# Patient Record
Sex: FEMALE | Race: WHITE | NOT HISPANIC OR LATINO | Employment: OTHER | URBAN - NONMETROPOLITAN AREA
[De-identification: names, ages, dates, MRNs, and addresses within clinical notes are randomized per-mention and may not be internally consistent; named-entity substitution may affect disease eponyms.]

---

## 2017-05-16 ENCOUNTER — OFFICE VISIT (OUTPATIENT)
Dept: FAMILY MEDICINE | Facility: OTHER | Age: 67
End: 2017-05-16
Attending: NURSE PRACTITIONER
Payer: COMMERCIAL

## 2017-05-16 VITALS
TEMPERATURE: 98.3 F | RESPIRATION RATE: 20 BRPM | HEIGHT: 68 IN | BODY MASS INDEX: 27.13 KG/M2 | DIASTOLIC BLOOD PRESSURE: 76 MMHG | WEIGHT: 179 LBS | SYSTOLIC BLOOD PRESSURE: 128 MMHG | HEART RATE: 80 BPM | OXYGEN SATURATION: 99 %

## 2017-05-16 DIAGNOSIS — R30.0 DYSURIA: Primary | ICD-10-CM

## 2017-05-16 LAB
ALBUMIN UR-MCNC: NEGATIVE MG/DL
APPEARANCE UR: ABNORMAL
BACTERIA #/AREA URNS HPF: ABNORMAL /HPF
BILIRUB UR QL STRIP: NEGATIVE
COLOR UR AUTO: ABNORMAL
GLUCOSE UR STRIP-MCNC: NEGATIVE MG/DL
HGB UR QL STRIP: NEGATIVE
KETONES UR STRIP-MCNC: NEGATIVE MG/DL
LEUKOCYTE ESTERASE UR QL STRIP: ABNORMAL
MUCOUS THREADS #/AREA URNS LPF: PRESENT /LPF
NITRATE UR QL: NEGATIVE
PH UR STRIP: 7.5 PH (ref 4.7–8)
RBC #/AREA URNS AUTO: <1 /HPF (ref 0–2)
SP GR UR STRIP: 1.01 (ref 1–1.03)
SQUAMOUS #/AREA URNS AUTO: 16 /HPF (ref 0–1)
URN SPEC COLLECT METH UR: ABNORMAL
UROBILINOGEN UR STRIP-MCNC: NORMAL MG/DL (ref 0–2)
WBC #/AREA URNS AUTO: 46 /HPF (ref 0–2)

## 2017-05-16 PROCEDURE — 99212 OFFICE O/P EST SF 10 MIN: CPT

## 2017-05-16 PROCEDURE — 87086 URINE CULTURE/COLONY COUNT: CPT | Mod: ZL | Performed by: NURSE PRACTITIONER

## 2017-05-16 PROCEDURE — 99213 OFFICE O/P EST LOW 20 MIN: CPT | Performed by: NURSE PRACTITIONER

## 2017-05-16 PROCEDURE — 81001 URINALYSIS AUTO W/SCOPE: CPT | Mod: ZL | Performed by: NURSE PRACTITIONER

## 2017-05-16 RX ORDER — MULTIPLE VITAMINS W/ MINERALS TAB 9MG-400MCG
1 TAB ORAL DAILY
COMMUNITY
End: 2019-07-26

## 2017-05-16 RX ORDER — CIPROFLOXACIN 500 MG/1
500 TABLET, FILM COATED ORAL 2 TIMES DAILY
Qty: 14 TABLET | Refills: 0 | Status: SHIPPED | OUTPATIENT
Start: 2017-05-16 | End: 2017-05-26

## 2017-05-16 ASSESSMENT — PAIN SCALES - GENERAL: PAINLEVEL: SEVERE PAIN (7)

## 2017-05-16 NOTE — PROGRESS NOTES
SUBJECTIVE:                                                    Sammie Heaton is a 67 year old female who presents to clinic today for the following health issues:      URINARY TRACT SYMPTOMS      Duration: April 12 started Macrobid from [place],     Description  dysuria, frequency, urgency, odor and orange urine once    Intensity:  moderate    Accompanying signs and symptoms:  Fever/chills: YES- chills and low grade fevers  Flank pain YES- slight pain mild discomfort  Nausea and vomiting: no   Vaginal symptoms: discharge  Abdominal/Pelvic Pain: YES- low abdomin     History  History of frequent UTI's: YES- frequent and to urology in the last year  History of kidney stones: no   Sexually Active: no   Possibility of pregnancy: No    Precipitating or alleviating factors: increasing fluids helps with discomfort    Therapies tried and outcome: increase fluid intake   Outcome: decreases throughout the day           Problem list and histories reviewed & adjusted, as indicated.  Additional history: as documented    Patient Active Problem List   Diagnosis     ACP (advance care planning)     Venous stasis ulcers of both lower extremities (H)     Past Surgical History:   Procedure Laterality Date     COLONOSCOPY  11-     ENT SURGERY      tonsillectomy     GYN SURGERY  1987    hysterectomy     GYN SURGERY  2014    right ovary removed at [place]     PHACOEMULSIFICATION WITH STANDARD INTRAOCULAR LENS IMPLANT Left 9/10/2015    Procedure: PHACOEMULSIFICATION WITH STANDARD INTRAOCULAR LENS IMPLANT;  Surgeon: Ray Lucero MD;  Location: [facility]     PHACOEMULSIFICATION WITH STANDARD INTRAOCULAR LENS IMPLANT Right 9/24/2015    Procedure: PHACOEMULSIFICATION WITH STANDARD INTRAOCULAR LENS IMPLANT;  Surgeon: Ray Lucero MD;  Location: [facility] OR       Social History   Substance Use Topics     Smoking status: Former Smoker     Packs/day: 2.00     Years: 15.00     Types: Cigarettes     Quit date: 6/14/1983     Smokeless tobacco:  "Never Used     Alcohol use No     Family History   Problem Relation Age of Onset     HEART DISEASE Mother      C.A.D. Mother      HEART DISEASE Father      MI     Blood Disease Father      Breast Cancer Sister          Current Outpatient Prescriptions   Medication Sig Dispense Refill     multivitamin, therapeutic with minerals (MULTI-VITAMIN) TABS tablet Take 1 tablet by mouth daily       Ascorbic Acid (VITAMIN C PO) Take 500 mg by mouth 2 times daily       aspirin 81 MG tablet Take 1 tablet by mouth daily.       UNABLE TO FIND Take by mouth 3 times daily as needed MEDICATION NAME: Bone maximizer 3       sodium chloride (OCEAN) 0.65 % nasal spray Spray 1 spray into both nostrils daily as needed for congestion       Triamcinolone Acetonide (NASACORT AQ NA) Spray 1 spray in nostril daily       KRILL OIL OMEGA-3 PO Take by mouth 2 times daily       GARLIC PO Take 1 tablet by mouth daily       GABAPENTIN PO Take 300 mg by mouth 2 times daily       omeprazole (PRILOSEC) 20 MG capsule Take 1 capsule by mouth daily.       Coenzyme Q10 (CO Q-10 PO) Take 1 tablet by mouth daily.       Allergies   Allergen Reactions     Sulfa Drugs Other (See Comments)     Flush; sulfonamide antibiotics       Reviewed and updated as needed this visit by clinical staff  Allergies       Reviewed and updated as needed this visit by Provider         ROS:  CONSTITUTIONAL:chills and fever low grade  E/M: NEGATIVE for ear, mouth and throat problems  R: NEGATIVE for significant cough or SOB  CV: NEGATIVE for chest pain, palpitations or peripheral edema  GI: abdominal pain lower abdomen   : dysuria, frequency, hesitancy, retention, urgency and urinary tract infection    OBJECTIVE:                                                    /76 (BP Location: Left arm, Patient Position: Chair, Cuff Size: Adult Large)  Pulse 80  Temp 98.3  F (36.8  C) (Tympanic)  Resp 20  Ht 5' 8\" (1.727 m)  Wt 179 lb (81.2 kg)  SpO2 99%  BMI 27.22 kg/m2  Body " mass index is 27.22 kg/(m^2).   GENERAL: healthy, alert and no distress  NECK: no adenopathy, no asymmetry, masses, or scars and thyroid normal to palpation  RESP: lungs clear to auscultation - no rales, rhonchi or wheezes  CV: regular rate and rhythm, normal S1 S2, no S3 or S4, no murmur, click or rub, no peripheral edema and peripheral pulses strong  ABDOMEN: tenderness suprapubic and bowel sounds normal  SKIN: dryness to left wrist itching  PSYCH: mentation appears normal, affect normal/bright    Diagnostic Test Results:  Results for orders placed or performed in visit on 05/16/17 (from the past 24 hour(s))   UA reflex to Microscopic and Culture   Result Value Ref Range    Color Urine Light Yellow     Appearance Urine Slightly Cloudy     Glucose Urine Negative NEG mg/dL    Bilirubin Urine Negative NEG    Ketones Urine Negative NEG mg/dL    Specific Gravity Urine 1.007 1.003 - 1.035    Blood Urine Negative NEG    pH Urine 7.5 4.7 - 8.0 pH    Protein Albumin Urine Negative NEG mg/dL    Urobilinogen mg/dL Normal 0.0 - 2.0 mg/dL    Nitrite Urine Negative NEG    Leukocyte Esterase Urine Large (A) NEG    Source Midstream Urine     RBC Urine <1 0 - 2 /HPF    WBC Urine 46 (H) 0 - 2 /HPF    Bacteria Urine Few (A) NEG /HPF    Squamous Epithelial /HPF Urine 16 (H) 0 - 1 /HPF    Mucous Urine Present (A) NEG /LPF        ASSESSMENT:                                                        PLAN:                                                    ASSESSMENT / PLAN:  (R30.0) Dysuria  (primary encounter diagnosis)  Comment:   Plan:  UA reflex to Microscopic and Culture,    Urine Culture Aerobic Bacterial,    ciprofloxacin (CIPRO) 500 MG tablet   Drink plenty of water               Follow up in 10 days with your PCP for repeat urine - should be off antibiotic for 48 hours prior to urine collection. Follow sooner if symptoms worsen        SAMANTHA Hightower [facility] [place]

## 2017-05-16 NOTE — NURSING NOTE
"Chief Complaint   Patient presents with     UTI       Initial /76 (BP Location: Left arm, Patient Position: Chair, Cuff Size: Adult Large)  Pulse 80  Temp 98.3  F (36.8  C) (Tympanic)  Resp 20  Ht 5' 8\" (1.727 m)  Wt 179 lb (81.2 kg)  SpO2 99%  BMI 27.22 kg/m2 Estimated body mass index is 27.22 kg/(m^2) as calculated from the following:    Height as of this encounter: 5' 8\" (1.727 m).    Weight as of this encounter: 179 lb (81.2 kg).  Medication Reconciliation: complete   Gavi Lancaster      "

## 2017-05-16 NOTE — PATIENT INSTRUCTIONS
ASSESSMENT / PLAN:  (R30.0) Dysuria  (primary encounter diagnosis)  Comment:   Plan:  UA reflex to Microscopic and Culture,    Urine Culture Aerobic Bacterial,    ciprofloxacin (CIPRO) 500 MG tablet   Drink plenty of water      Follow up with PCP in 10 days should be off antibiotic for at least 48 hours before repeating urine

## 2017-05-16 NOTE — MR AVS SNAPSHOT
"              After Visit Summary   5/16/2017    Sammie Heaton    MRN: [id number]           Patient Information     Date Of Birth          1950        Visit Information        Provider Department      5/16/2017 3:10 PM Shraddha Newton APRN CNP [facility]        Today's Diagnoses     Dysuria    -  1      Care Instructions    ASSESSMENT / PLAN:  (R30.0) Dysuria  (primary encounter diagnosis)  Comment:   Plan:  UA reflex to Microscopic and Culture,    Urine Culture Aerobic Bacterial,    ciprofloxacin (CIPRO) 500 MG tablet   Drink plenty of water      Follow up with PCP in 10 days should be off antibiotic for at least 48 hours before repeating urine        Follow-ups after your visit        Follow-up notes from your care team     Return in about 10 days (around 5/26/2017).      Who to contact     If you have questions or need follow up information about today's clinic visit or your schedule please contact [facility] directly at [phone].  Normal or non-critical lab and imaging results will be communicated to you by [employer], letter or phone within 4 business days after the clinic has received the results. If you do not hear from us within 7 days, please contact the clinic through [employer] or phone. If you have a critical or abnormal lab result, we will notify you by phone as soon as possible.  Submit refill requests through [employer] or call your pharmacy and they will forward the refill request to us. Please allow 3 business days for your refill to be completed.          Additional Information About Your Visit        [employer] Information     [employer] lets you send messages to your doctor, view your test results, renew your prescriptions, schedule appointments and more. To sign up, go to [email or web address] . Click on \"Log in\" on the left side of the screen, which will take you to the Welcome page. Then click on \"Sign up Now\" on the right side of the page.     You will be " "asked to enter the access code listed below, as well as some personal information. Please follow the directions to create your username and password.     Your access code is: [email or web address]  Expires: 2017  4:55 PM     Your access code will  in 90 days. If you need help or a new code, please call your [facility] or [phone].        Care EveryWhere ID     This is your Care EveryWhere ID. This could be used by other organizations to access your [place] medical records  [id number]        Your Vitals Were     Pulse Temperature Respirations Height Pulse Oximetry BMI (Body Mass Index)    80 98.3  F (36.8  C) (Tympanic) 20 5' 8\" (1.727 m) 99% 27.22 kg/m2       Blood Pressure from Last 3 Encounters:   17 128/76   16 124/78   16 120/70    Weight from Last 3 Encounters:   17 179 lb (81.2 kg)   16 180 lb (81.6 kg)   16 175 lb (79.4 kg)              We Performed the Following     UA reflex to Microscopic and Culture     Urine Culture Aerobic Bacterial          Today's Medication Changes          These changes are accurate as of: 17  4:55 PM.  If you have any questions, ask your nurse or doctor.               Start taking these medicines.        Dose/Directions    ciprofloxacin 500 MG tablet   Commonly known as:  CIPRO   Used for:  Dysuria   Started by:  Shraddha Newton APRN CNP        Dose:  500 mg   Take 1 tablet (500 mg) by mouth 2 times daily   Quantity:  14 tablet   Refills:  0         Stop taking these medicines if you haven't already. Please contact your care team if you have questions.     vitamin B complex with vitamin C Tabs tablet   Stopped by:  Shraddha Newton APRN CNP                Where to get your medicines      These medications were sent to [id number] [address] [place] - [address]   [id number] , [place] [address]     Phone:  [phone]     ciprofloxacin 500 MG tablet                Primary Care Provider Office Phone # " Fax #    Paddy Ibrahim [phone]       [facility] [address]  [facility] [address]        Thank you!     Thank you for choosing [facility]  for your care. Our goal is always to provide you with excellent care. Hearing back from our patients is one way we can continue to improve our services. Please take a few minutes to complete the written survey that you may receive in the mail after your visit with us. Thank you!             Your Updated Medication List - Protect others around you: Learn how to safely use, store and throw away your medicines at www.disposemymeds.org.          This list is accurate as of: 5/16/17  4:55 PM.  Always use your most recent med list.                   Brand Name Dispense Instructions for use    aspirin 81 MG tablet      Take 1 tablet by mouth daily.       ciprofloxacin 500 MG tablet    CIPRO    14 tablet    Take 1 tablet (500 mg) by mouth 2 times daily       CO Q-10 PO      Take 1 tablet by mouth daily.       GABAPENTIN PO      Take 300 mg by mouth 2 times daily       GARLIC PO      Take 1 tablet by mouth daily       KRILL OIL OMEGA-3 PO      Take by mouth 2 times daily       Multi-vitamin Tabs tablet      Take 1 tablet by mouth daily       NASACORT AQ NA      [place] 1 spray in nostril daily       priLOSEC 20 MG CR capsule   Generic drug:  omeprazole      Take 1 capsule by mouth daily.       sodium chloride 0.65 % nasal spray    OCEAN     Spray 1 spray into both nostrils daily as needed for congestion       UNABLE TO FIND      Take by mouth 3 times daily as needed MEDICATION NAME: Bone maximizer 3       VITAMIN C PO      Take 500 mg by mouth 2 times daily

## 2017-05-18 LAB
BACTERIA SPEC CULT: ABNORMAL
MICRO REPORT STATUS: ABNORMAL
SPECIMEN SOURCE: ABNORMAL

## 2017-05-26 ENCOUNTER — OFFICE VISIT (OUTPATIENT)
Dept: FAMILY MEDICINE | Facility: OTHER | Age: 67
End: 2017-05-26
Attending: PHYSICIAN ASSISTANT
Payer: MEDICARE

## 2017-05-26 VITALS
TEMPERATURE: 97.1 F | HEIGHT: 68 IN | RESPIRATION RATE: 16 BRPM | SYSTOLIC BLOOD PRESSURE: 148 MMHG | DIASTOLIC BLOOD PRESSURE: 83 MMHG | HEART RATE: 84 BPM | BODY MASS INDEX: 26.64 KG/M2 | WEIGHT: 175.8 LBS | OXYGEN SATURATION: 98 %

## 2017-05-26 DIAGNOSIS — Z87.440 PERSONAL HISTORY OF URINARY TRACT INFECTION: Primary | ICD-10-CM

## 2017-05-26 DIAGNOSIS — R30.0 DYSURIA: ICD-10-CM

## 2017-05-26 LAB
BASOPHILS # BLD AUTO: 0 10E9/L (ref 0–0.2)
BASOPHILS NFR BLD AUTO: 0.2 %
DIFFERENTIAL METHOD BLD: NORMAL
EOSINOPHIL # BLD AUTO: 0.2 10E9/L (ref 0–0.7)
EOSINOPHIL NFR BLD AUTO: 1.8 %
ERYTHROCYTE [DISTWIDTH] IN BLOOD BY AUTOMATED COUNT: 12.4 % (ref 10–15)
HCT VFR BLD AUTO: 39.6 % (ref 35–47)
HGB BLD-MCNC: 13.4 G/DL (ref 11.7–15.7)
LYMPHOCYTES # BLD AUTO: 2.7 10E9/L (ref 0.8–5.3)
LYMPHOCYTES NFR BLD AUTO: 33.6 %
MCH RBC QN AUTO: 29 PG (ref 26.5–33)
MCHC RBC AUTO-ENTMCNC: 33.8 G/DL (ref 31.5–36.5)
MCV RBC AUTO: 86 FL (ref 78–100)
MONOCYTES # BLD AUTO: 0.6 10E9/L (ref 0–1.3)
MONOCYTES NFR BLD AUTO: 7.1 %
NEUTROPHILS # BLD AUTO: 4.7 10E9/L (ref 1.6–8.3)
NEUTROPHILS NFR BLD AUTO: 57.3 %
PLATELET # BLD AUTO: 300 10E9/L (ref 150–450)
RBC # BLD AUTO: 4.62 10E12/L (ref 3.8–5.2)
WBC # BLD AUTO: 8.1 10E9/L (ref 4–11)

## 2017-05-26 PROCEDURE — 87086 URINE CULTURE/COLONY COUNT: CPT | Mod: ZL | Performed by: PHYSICIAN ASSISTANT

## 2017-05-26 PROCEDURE — 99213 OFFICE O/P EST LOW 20 MIN: CPT | Performed by: PHYSICIAN ASSISTANT

## 2017-05-26 PROCEDURE — 85025 COMPLETE CBC W/AUTO DIFF WBC: CPT | Mod: ZL | Performed by: PHYSICIAN ASSISTANT

## 2017-05-26 PROCEDURE — 99212 OFFICE O/P EST SF 10 MIN: CPT

## 2017-05-26 PROCEDURE — 36415 COLL VENOUS BLD VENIPUNCTURE: CPT | Mod: ZL | Performed by: PHYSICIAN ASSISTANT

## 2017-05-26 ASSESSMENT — PAIN SCALES - GENERAL: PAINLEVEL: NO PAIN (0)

## 2017-05-26 NOTE — NURSING NOTE
"Chief Complaint   Patient presents with     UTI     Pt is in for a FU after UTI. Pt saw Shraddha Slater on 05/16/17 and was put on Cipro.       Initial /83 (BP Location: Right arm, Patient Position: Chair, Cuff Size: Adult Regular)  Pulse 84  Temp 97.1  F (36.2  C) (Tympanic)  Resp 16  Ht 5' 8\" (1.727 m)  Wt 175 lb 12.8 oz (79.7 kg)  SpO2 98%  BMI 26.73 kg/m2 Estimated body mass index is 26.73 kg/(m^2) as calculated from the following:    Height as of this encounter: 5' 8\" (1.727 m).    Weight as of this encounter: 175 lb 12.8 oz (79.7 kg).  Medication Reconciliation: complete   Luz Ford    "

## 2017-05-26 NOTE — MR AVS SNAPSHOT
"              After Visit Summary   5/26/2017    Sammie Heaton    MRN: [id number]           Patient Information     Date Of Birth          1950        Visit Information        Provider Department      5/26/2017 1:45 PM Chen Rand PA [facility]        Today's Diagnoses     Personal history of urinary tract infection    -  1    Dysuria           Follow-ups after your visit        Who to contact     If you have questions or need follow up information about today's clinic visit or your schedule please contact [facility] directly at [phone].  Normal or non-critical lab and imaging results will be communicated to you by MyChart, letter or phone within 4 business days after the clinic has received the results. If you do not hear from us within 7 days, please contact the clinic through MyChart or phone. If you have a critical or abnormal lab result, we will notify you by phone as soon as possible.  Submit refill requests through [employer] or call your pharmacy and they will forward the refill request to us. Please allow 3 business days for your refill to be completed.          Additional Information About Your Visit        Care EveryWhere ID     This is your Care EveryWhere ID. This could be used by other organizations to access your [place] medical records  [id number]        Your Vitals Were     Pulse Temperature Respirations Height Pulse Oximetry BMI (Body Mass Index)    84 97.1  F (36.2  C) (Tympanic) 16 5' 8\" (1.727 m) 98% 26.73 kg/m2       Blood Pressure from Last 3 Encounters:   05/26/17 148/83   05/16/17 128/76   09/08/16 124/78    Weight from Last 3 Encounters:   05/26/17 175 lb 12.8 oz (79.7 kg)   05/16/17 179 lb (81.2 kg)   09/08/16 180 lb (81.6 kg)              We Performed the Following     CBC with platelets and differential     Urine Culture Aerobic Bacterial          Today's Medication Changes          These changes are accurate as of: 5/26/17  2:26 PM.  If " you have any questions, ask your nurse or doctor.               Start taking these medicines.        Dose/Directions    amoxicillin-clavulanate 875-125 MG per tablet   Commonly known as:  AUGMENTIN   Used for:  Dysuria   Started by:  Chen Rand PA        Dose:  1 tablet   Take 1 tablet by mouth 2 times daily   Quantity:  20 tablet   Refills:  0            Where to get your medicines      These medications were sent to [id number] - [place] - [address]   [id number] , [place] [address]     Phone:  [phone]     amoxicillin-clavulanate 875-125 MG per tablet                Primary Care Provider Office Phone # Fax #    Paddy Ibrahim [phone]       [address] [address]        Thank you!     Thank you for choosing [facility]  for your care. Our goal is always to provide you with excellent care. Hearing back from our patients is one way we can continue to improve our services. Please take a few minutes to complete the written survey that you may receive in the mail after your visit with us. Thank you!             Your Updated Medication List - Protect others around you: Learn how to safely use, store and throw away your medicines at www.disposemymeds.org.          This list is accurate as of: 5/26/17  2:26 PM.  Always use your most recent med list.                   Brand Name Dispense Instructions for use    amoxicillin-clavulanate 875-125 MG per tablet    AUGMENTIN    20 tablet    Take 1 tablet by mouth 2 times daily       aspirin 81 MG tablet      Take 1 tablet by mouth daily.       CO Q-10 PO      Take 1 tablet by mouth daily.       GABAPENTIN PO      Take 300 mg by mouth 2 times daily       GARLIC PO      Take 1 tablet by mouth daily       KRILL OIL OMEGA-3 PO      Take by mouth 2 times daily       LIPITOR PO      Take 10 mg by mouth daily       Multi-vitamin Tabs tablet      Take 1 tablet by mouth daily        NASACORT AQ NA      Spray 1 spray in nostril daily       PROBIOTIC DAILY PO      Take 1 capsule by mouth daily       sodium chloride 0.65 % nasal spray    OCEAN     Spray 1 spray into both nostrils daily as needed for congestion       TUMS PO      Take by mouth as needed       UNABLE TO FIND      Take by mouth 3 times daily as needed MEDICATION NAME: Bone maximizer 3       VITAMIN C PO      Take 500 mg by mouth 2 times daily

## 2017-05-26 NOTE — PROGRESS NOTES
Chief complaint:   Chief Complaint   Patient presents with     UTI     Pt is in for a FU after UTI. Pt saw Shraddha Slater on 05/16/17 and was put on Cipro.       Subjective: Sammie Heaton is a 67 year old female for f/u of UTI. Symptoms since 4-12. Treated with Macrobid and Cipro without resolution. No dysuria but urinary frequency and urgency. Denies flank pain, nausea, vomiting, fever or abnormal vaginal discharge    PMH, PSH, FH reviewed and unchanged    Current Outpatient Prescriptions   Medication Sig Dispense Refill     Calcium Carbonate Antacid (TUMS PO) Take by mouth as needed       Probiotic Product (PROBIOTIC DAILY PO) Take 1 capsule by mouth daily       Atorvastatin Calcium (LIPITOR PO) Take 10 mg by mouth daily       multivitamin, therapeutic with minerals (MULTI-VITAMIN) TABS tablet Take 1 tablet by mouth daily       Ascorbic Acid (VITAMIN C PO) Take 500 mg by mouth 2 times daily       UNABLE TO FIND Take by mouth 3 times daily as needed MEDICATION NAME: Bone maximizer 3       sodium chloride (OCEAN) 0.65 % nasal spray Spray 1 spray into both nostrils daily as needed for congestion       Triamcinolone Acetonide (NASACORT AQ NA) Spray 1 spray in nostril daily       KRILL OIL OMEGA-3 PO Take by mouth 2 times daily       GARLIC PO Take 1 tablet by mouth daily       GABAPENTIN PO Take 300 mg by mouth 2 times daily       aspirin 81 MG tablet Take 1 tablet by mouth daily.       Coenzyme Q10 (CO Q-10 PO) Take 1 tablet by mouth daily.         Allergies   Allergen Reactions     Sulfa Drugs Other (See Comments)     Flush; sulfonamide antibiotics         ROS:  GI/: as above  Other pertinent systems reviewed and negative    Objective:   B/P: 148/83, T: 97.1, P: 84, R: 16    Gen:Appears well, in no apparent distress.   Resp: Lungs CTA without wheeze, rales, rhonchi  Card: RRR  Abd:Round, soft. Normal bowel sounds. NTTP. No mass or organomegaly. No CVA TTP.        Assessment:     (R30.0) Dysuria  Comment:  Urine reflexed to culture. Start Augmentin pending culture results  Plan: Urine Culture Aerobic Bacterial, CBC with         platelets and differential,         amoxicillin-clavulanate (AUGMENTIN) 875-125 MG         per tablet              Plan: Prescription per Epic - also push fluids, recommend cranberry juice. Call or return to clinic prn if these symptoms worsen or fail to improve as anticipated.        Chen Rand PA-C

## 2017-05-28 LAB
BACTERIA SPEC CULT: ABNORMAL
MICRO REPORT STATUS: ABNORMAL
SPECIMEN SOURCE: ABNORMAL

## 2017-06-01 ENCOUNTER — OFFICE VISIT (OUTPATIENT)
Dept: FAMILY MEDICINE | Facility: OTHER | Age: 67
End: 2017-06-01
Attending: PHYSICIAN ASSISTANT
Payer: COMMERCIAL

## 2017-06-01 VITALS
OXYGEN SATURATION: 98 % | TEMPERATURE: 98.1 F | SYSTOLIC BLOOD PRESSURE: 126 MMHG | WEIGHT: 175 LBS | BODY MASS INDEX: 26.52 KG/M2 | DIASTOLIC BLOOD PRESSURE: 70 MMHG | RESPIRATION RATE: 16 BRPM | HEART RATE: 85 BPM | HEIGHT: 68 IN

## 2017-06-01 DIAGNOSIS — N76.0 BACTERIAL VAGINOSIS: ICD-10-CM

## 2017-06-01 DIAGNOSIS — R30.0 DYSURIA: Primary | ICD-10-CM

## 2017-06-01 DIAGNOSIS — B37.31 CANDIDIASIS OF VAGINA: ICD-10-CM

## 2017-06-01 DIAGNOSIS — N89.8 VAGINAL DISCHARGE: ICD-10-CM

## 2017-06-01 DIAGNOSIS — B96.89 BACTERIAL VAGINOSIS: ICD-10-CM

## 2017-06-01 LAB
ALBUMIN UR-MCNC: NEGATIVE MG/DL
APPEARANCE UR: CLEAR
BACTERIA #/AREA URNS HPF: ABNORMAL /HPF
BILIRUB UR QL STRIP: NEGATIVE
COLOR UR AUTO: YELLOW
GLUCOSE UR STRIP-MCNC: NEGATIVE MG/DL
HGB UR QL STRIP: NEGATIVE
KETONES UR STRIP-MCNC: NEGATIVE MG/DL
LEUKOCYTE ESTERASE UR QL STRIP: ABNORMAL
MICRO REPORT STATUS: ABNORMAL
NITRATE UR QL: NEGATIVE
NON-SQ EPI CELLS #/AREA URNS LPF: ABNORMAL /LPF
PH UR STRIP: 6.5 PH (ref 5–7)
RBC #/AREA URNS AUTO: ABNORMAL /HPF (ref 0–2)
SP GR UR STRIP: <=1.005 (ref 1–1.03)
SPECIMEN SOURCE: ABNORMAL
URN SPEC COLLECT METH UR: ABNORMAL
UROBILINOGEN UR STRIP-ACNC: 0.2 EU/DL (ref 0.2–1)
WBC #/AREA URNS AUTO: ABNORMAL /HPF (ref 0–2)
WET PREP SPEC: ABNORMAL

## 2017-06-01 PROCEDURE — 87210 SMEAR WET MOUNT SALINE/INK: CPT | Mod: ZL | Performed by: PHYSICIAN ASSISTANT

## 2017-06-01 PROCEDURE — 99213 OFFICE O/P EST LOW 20 MIN: CPT | Performed by: PHYSICIAN ASSISTANT

## 2017-06-01 PROCEDURE — 81001 URINALYSIS AUTO W/SCOPE: CPT | Mod: ZL | Performed by: PHYSICIAN ASSISTANT

## 2017-06-01 PROCEDURE — 99212 OFFICE O/P EST SF 10 MIN: CPT

## 2017-06-01 RX ORDER — FLUCONAZOLE 150 MG/1
150 TABLET ORAL
Qty: 4 TABLET | Refills: 0 | Status: SHIPPED | OUTPATIENT
Start: 2017-06-01 | End: 2017-08-08

## 2017-06-01 RX ORDER — METRONIDAZOLE 500 MG/1
500 TABLET ORAL 2 TIMES DAILY
Qty: 14 TABLET | Refills: 0 | Status: SHIPPED | OUTPATIENT
Start: 2017-06-01 | End: 2017-06-09

## 2017-06-01 ASSESSMENT — PAIN SCALES - GENERAL: PAINLEVEL: MODERATE PAIN (4)

## 2017-06-01 NOTE — PROGRESS NOTES
Subjective:  Sammie Heaton is a 67 year old female  who presents with a 1 week history of vaginal discharge. Itchy. Low pelvic pain. She has been on 3 different antibiotics recently for UTI    Active diagnoses this visit:     UTI (urinary tract infection)  Dysuria    Past Medical History:   Diagnosis Date     Abdominal pain, unspecified site 09/19/2001     Cystitis, unspecified 05/12/2003     Dermatophytosis of the body 11/07/2006     Follow-up examination, following unspecified surgery 03/26/2003     Hemorrhage of rectum and anus 10/20/2005     Nonspecific abnormal findings on radiological and 06/27/2000     Osteoporosis, unspecified 07/26/2000     Other abnormal findings on radiological examinatio 03/18/2002     Other screening mammogram 09/11/2001     Routine general medical examination at a health care facility 06/14/2000     Sebaceous cyst 03/10/2003     Unspecified ulcer of lower limb 11/06/2003     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) 12/03/2003     Venous (peripheral) insufficiency, unspecified 05/27/2004       Past Surgical History:   Procedure Laterality Date     COLONOSCOPY  11-     ENT SURGERY      tonsillectomy     GYN SURGERY  1987    hysterectomy     GYN SURGERY  2014    right ovary removed at [place]     PHACOEMULSIFICATION WITH STANDARD INTRAOCULAR LENS IMPLANT Left 9/10/2015    Procedure: PHACOEMULSIFICATION WITH STANDARD INTRAOCULAR LENS IMPLANT;  Surgeon: Ray Lucero MD;  Location: [facility] OR     PHACOEMULSIFICATION WITH STANDARD INTRAOCULAR LENS IMPLANT Right 9/24/2015    Procedure: PHACOEMULSIFICATION WITH STANDARD INTRAOCULAR LENS IMPLANT;  Surgeon: Ray Lucero MD;  Location: [facility] OR       Family History   Problem Relation Age of Onset     HEART DISEASE Mother      C.A.D. Mother      HEART DISEASE Father      MI     Blood Disease Father      Breast Cancer Sister        Current Outpatient Prescriptions   Medication     Calcium Carbonate Antacid (TUMS PO)     Probiotic  Product (PROBIOTIC DAILY PO)     Atorvastatin Calcium (LIPITOR PO)     amoxicillin-clavulanate (AUGMENTIN) 875-125 MG per tablet     multivitamin, therapeutic with minerals (MULTI-VITAMIN) TABS tablet     Ascorbic Acid (VITAMIN C PO)     UNABLE TO FIND     sodium chloride (OCEAN) 0.65 % nasal spray     Triamcinolone Acetonide (NASACORT AQ NA)     KRILL OIL OMEGA-3 PO     GARLIC PO     GABAPENTIN PO     aspirin 81 MG tablet     Coenzyme Q10 (CO Q-10 PO)     No current facility-administered medications for this visit.        Allergies   Allergen Reactions     Sulfa Drugs Other (See Comments)     Flush; sulfonamide antibiotics       Review of Systems:  Gen: negative for fever, chills, change in weight  Derm: negative for  rash  GI: negative for abdominal pain, nausea, vomiting, diarrhea  : As above. Negative for urinary frequency, dysuria, or hematuria, pelvic pain  Psych: negative for changes in mood or affect    Objective:    B/P: 126/70, T: 98.1, P: 85, R: 16    Physical Exam:  Constitutional: healthy, alert and no acute distress  CV: RRR. No murmur  Pulm: Lungs clear to auscultation without wheeze, rales or rhonchi  GI: Abdomen soft, non-tender. BS normal. No masses, organomegaly  Pelvic: Normal external genitalia and vaginal mucosa. Wet prep. Bimanual exam reveals no cervical motion tenderness. No adnexal tenderness to palpation. No mass.  Skin: no suspicious lesions or rashes  Psych: mentation and affect appear normal      Assessment/Plan        (N39.0) UTI (urinary tract infection)  Comment: UA improved. Finish out Augmentin  Plan: *UA reflex to Microscopic and Culture            (N89.8) Vaginal discharge  Plan: Wet prep            (N76.0,  B96.89) Bacterial vaginosis  Comment: Positive  Plan: metroNIDAZOLE (FLAGYL) 500 MG tablet            (B37.3) Candidiasis of vagina  Comment: Positive  Plan: fluconazole (DIFLUCAN) 150 MG tablet                  Rx per EPIC.  Risk/benefit/side effects and intended purposes  discussed.   Rest, increase fluids. Tylenol for fever or discomfort. Follow up if symptoms persist or worsen.      Chen Rand, PAC

## 2017-06-01 NOTE — MR AVS SNAPSHOT
"              After Visit Summary   6/1/2017    Sammie Heaton    MRN: [id number]           Patient Information     Date Of Birth          1950        Visit Information        Provider Department      6/1/2017 3:00 PM Chen Rand PA [facility]        Today's Diagnoses     Dysuria    -  1    UTI (urinary tract infection)        Vaginal discharge        Bacterial vaginosis        Candidiasis of vagina           Follow-ups after your visit        Who to contact     If you have questions or need follow up information about today's clinic visit or your schedule please contact [facility] directly at [phone].  Normal or non-critical lab and imaging results will be communicated to you by MyChart, letter or phone within 4 business days after the clinic has received the results. If you do not hear from us within 7 days, please contact the clinic through MyChart or phone. If you have a critical or abnormal lab result, we will notify you by phone as soon as possible.  Submit refill requests through [employer] or call your pharmacy and they will forward the refill request to us. Please allow 3 business days for your refill to be completed.          Additional Information About Your Visit        Care EveryWhere ID     This is your Care EveryWhere ID. This could be used by other organizations to access your [place] medical records  [id number]        Your Vitals Were     Pulse Temperature Respirations Height Pulse Oximetry BMI (Body Mass Index)    85 98.1  F (36.7  C) (Tympanic) 16 5' 8\" (1.727 m) 98% 26.61 kg/m2       Blood Pressure from Last 3 Encounters:   06/01/17 126/70   05/26/17 148/83   05/16/17 128/76    Weight from Last 3 Encounters:   06/01/17 175 lb (79.4 kg)   05/26/17 175 lb 12.8 oz (79.7 kg)   05/16/17 179 lb (81.2 kg)              We Performed the Following     *UA reflex to Microscopic and Culture     Urine Microscopic     Wet prep          Today's Medication Changes "          These changes are accurate as of: 6/1/17  4:02 PM.  If you have any questions, ask your nurse or doctor.               Start taking these medicines.        Dose/Directions    fluconazole 150 MG tablet   Commonly known as:  DIFLUCAN   Used for:  Candidiasis of vagina   Started by:  Chen Rand PA        Dose:  150 mg   Take 1 tablet (150 mg) by mouth every 3 days   Quantity:  4 tablet   Refills:  0       metroNIDAZOLE 500 MG tablet   Commonly known as:  FLAGYL   Used for:  Bacterial vaginosis   Started by:  Chen Rand PA        Dose:  500 mg   Take 1 tablet (500 mg) by mouth 2 times daily   Quantity:  14 tablet   Refills:  0            Where to get your medicines      These medications were sent to [id number] - [place], [place] - [address]  [id number] , [place] [address]     Phone:  [phone]     fluconazole 150 MG tablet    metroNIDAZOLE 500 MG tablet                Primary Care Provider Office Phone # Fax #    Paddy Ibrahim [phone]       [address] [address]        Thank you!     Thank you for choosing [facility]  for your care. Our goal is always to provide you with excellent care. Hearing back from our patients is one way we can continue to improve our services. Please take a few minutes to complete the written survey that you may receive in the mail after your visit with us. Thank you!             Your Updated Medication List - Protect others around you: Learn how to safely use, store and throw away your medicines at www.disposemymeds.org.          This list is accurate as of: 6/1/17  4:02 PM.  Always use your most recent med list.                   Brand Name Dispense Instructions for use    amoxicillin-clavulanate 875-125 MG per tablet    AUGMENTIN    20 tablet    Take 1 tablet by mouth 2 times daily       aspirin 81 MG tablet      Take 1 tablet by mouth daily.       CO Q-10 PO      Take 1  tablet by mouth daily.       fluconazole 150 MG tablet    DIFLUCAN    4 tablet    Take 1 tablet (150 mg) by mouth every 3 days       GABAPENTIN PO      Take 300 mg by mouth 2 times daily       GARLIC PO      Take 1 tablet by mouth daily       KRILL OIL OMEGA-3 PO      Take by mouth 2 times daily       LIPITOR PO      Take 10 mg by mouth daily       metroNIDAZOLE 500 MG tablet    FLAGYL    14 tablet    Take 1 tablet (500 mg) by mouth 2 times daily       Multi-vitamin Tabs tablet      Take 1 tablet by mouth daily       NASACORT AQ NA      [place] 1 spray in nostril daily       PROBIOTIC DAILY PO      Take 1 capsule by mouth daily       sodium chloride 0.65 % nasal spray    OCEAN     Spray 1 spray into both nostrils daily as needed for congestion       TUMS PO      Take by mouth as needed       UNABLE TO FIND      Take by mouth 3 times daily as needed MEDICATION NAME: Bone maximizer 3       VITAMIN C PO      Take 500 mg by mouth 2 times daily

## 2017-06-07 ENCOUNTER — TRANSFERRED RECORDS (OUTPATIENT)
Dept: HEALTH INFORMATION MANAGEMENT | Facility: HOSPITAL | Age: 67
End: 2017-06-07

## 2017-06-07 LAB
CREAT SERPL-MCNC: 0.82 MG/DL (ref 0.4–1)
GLUCOSE SERPL-MCNC: 98 MG/DL (ref 70–100)
POTASSIUM SERPL-SCNC: 4.2 MEQ/L (ref 3.4–5.1)

## 2017-06-09 ENCOUNTER — OFFICE VISIT (OUTPATIENT)
Dept: FAMILY MEDICINE | Facility: OTHER | Age: 67
End: 2017-06-09
Attending: PHYSICIAN ASSISTANT
Payer: COMMERCIAL

## 2017-06-09 VITALS
DIASTOLIC BLOOD PRESSURE: 83 MMHG | OXYGEN SATURATION: 98 % | HEART RATE: 85 BPM | HEIGHT: 68 IN | WEIGHT: 176.6 LBS | TEMPERATURE: 98.6 F | BODY MASS INDEX: 26.76 KG/M2 | SYSTOLIC BLOOD PRESSURE: 134 MMHG | RESPIRATION RATE: 16 BRPM

## 2017-06-09 DIAGNOSIS — N81.11 CYSTOCELE, MIDLINE: Primary | ICD-10-CM

## 2017-06-09 DIAGNOSIS — Z71.89 ACP (ADVANCE CARE PLANNING): Chronic | ICD-10-CM

## 2017-06-09 DIAGNOSIS — N39.0 RECURRENT UTI: ICD-10-CM

## 2017-06-09 DIAGNOSIS — Z87.440 HX OF RECURRENT URINARY TRACT INFECTION: ICD-10-CM

## 2017-06-09 PROCEDURE — 99212 OFFICE O/P EST SF 10 MIN: CPT

## 2017-06-09 PROCEDURE — 99213 OFFICE O/P EST LOW 20 MIN: CPT | Performed by: PHYSICIAN ASSISTANT

## 2017-06-09 RX ORDER — CEFPODOXIME PROXETIL 100 MG/1
100 TABLET, FILM COATED ORAL 2 TIMES DAILY
COMMUNITY
Start: 2017-06-07 | End: 2017-06-17

## 2017-06-09 RX ORDER — CLONAZEPAM 0.5 MG/1
0.5 TABLET ORAL
COMMUNITY
Start: 2011-12-12 | End: 2018-07-09

## 2017-06-09 RX ORDER — PHENAZOPYRIDINE HYDROCHLORIDE 100 MG/1
100 TABLET, FILM COATED ORAL 3 TIMES DAILY
COMMUNITY
Start: 2017-06-07 | End: 2017-06-12

## 2017-06-09 ASSESSMENT — PAIN SCALES - GENERAL: PAINLEVEL: MILD PAIN (3)

## 2017-06-09 NOTE — NURSING NOTE
"Chief Complaint   Patient presents with     ER F/U     Pt is in for a Fu after being seen in the [facility] ER on 06/07/17 for a UTI. Pt an appt at the end of July ay the [facility]. Pt has an appt on 06/15/17 Dr Best.       Initial /83 (BP Location: Right arm, Patient Position: Chair, Cuff Size: Adult Regular)  Pulse 85  Temp 98.6  F (37  C) (Tympanic)  Resp 16  Ht 5' 8\" (1.727 m)  Wt 176 lb 9.6 oz (80.1 kg)  SpO2 98%  BMI 26.85 kg/m2 Estimated body mass index is 26.85 kg/(m^2) as calculated from the following:    Height as of this encounter: 5' 8\" (1.727 m).    Weight as of this encounter: 176 lb 9.6 oz (80.1 kg).  Medication Reconciliation: complete   Luz Ford    "

## 2017-06-09 NOTE — PROGRESS NOTES
Chief complaint:   Chief Complaint   Patient presents with     ER F/U     Pt is in for a Fu after being seen in the [facility] ER on 06/07/17 for a UTI. Pt an appt at the end of July ay the [facility] ER. Pt has an appt on 06/15/17 Dr Best.       Subjective: Sammie Heaton is a 67 year old female here for f/u recurrent UTI's. She was in [place] 2 days ago and had recurrence of low abdominal pain and went to ER. Dx with yet another UTI. Pelvic CT was normal and was advised to see urology for probable cystocele.    PMH, PSH, FH reviewed and unchanged    Current Outpatient Prescriptions   Medication Sig Dispense Refill     phenazopyridine (PYRIDIUM) 100 MG tablet Take 100 mg by mouth 3 times daily After meals for 5 days       clonazePAM (KLONOPIN) 0.5 MG tablet Take 0.5 mg by mouth 1/2 tablet at bedtime       cefpodoxime (VANTIN) 100 MG tablet Take 100 mg by mouth 2 times daily       fluconazole (DIFLUCAN) 150 MG tablet Take 1 tablet (150 mg) by mouth every 3 days 4 tablet 0     Calcium Carbonate Antacid (TUMS PO) Take by mouth as needed       Probiotic Product (PROBIOTIC DAILY PO) Take 1 capsule by mouth daily       Atorvastatin Calcium (LIPITOR PO) Take 10 mg by mouth daily       multivitamin, therapeutic with minerals (MULTI-VITAMIN) TABS tablet Take 1 tablet by mouth daily       Ascorbic Acid (VITAMIN C PO) Take 500 mg by mouth 2 times daily       UNABLE TO FIND Take by mouth 3 times daily as needed MEDICATION NAME: Bone maximizer 3       sodium chloride (OCEAN) 0.65 % nasal spray Spray 1 spray into both nostrils daily as needed for congestion       Triamcinolone Acetonide (NASACORT AQ NA) Spray 1 spray in nostril daily       KRILL OIL OMEGA-3 PO Take by mouth 2 times daily       GARLIC PO Take 1 tablet by mouth daily       GABAPENTIN PO Take 300 mg by mouth 2 times daily       aspirin 81 MG tablet Take 1 tablet by mouth daily.       Coenzyme Q10 (CO Q-10 PO) Take 1 tablet by mouth daily.         Allergies   Allergen  Reactions     Codeine      constipation     Sulfa Drugs Other (See Comments)     Flush; sulfonamide antibiotics         ROS:  GI/: as above  Other pertinent systems reviewed and negative    Objective:   B/P: 134/83, T: 98.6, P: 85, R: 16    Gen:Appears well, in no apparent distress.   Resp: Lungs CTA without wheeze, rales, rhonchi  Card: RRR  Abd:Round, soft. Normal bowel sounds. NTTP. No mass or organomegaly. No CVA TTP.        Assessment:   (N81.11) Cystocele, midline  (primary encounter diagnosis)  Comment: Referral to Dr. Weiss [facility]  Plan: UROLOGY ADULT REFERRAL            (Z71.89) ACP (advance care planning)      (Z87.440) Hx of recurrent urinary tract infection  Comment: as above. Finish out antibx      Plan: . Call or return to clinic prn if these symptoms worsen or fail to improve as anticipated.        Chen Rand PA-C

## 2017-06-09 NOTE — MR AVS SNAPSHOT
After Visit Summary   6/9/2017    Sammie Heaton    MRN: [id number]           Patient Information     Date Of Birth          1950        Visit Information        Provider Department      6/9/2017 2:30 PM Chen Rand PA [facility]        Today's Diagnoses     Cystocele, midline    -  1    ACP (advance care planning)        Recurrent UTI        Hx of recurrent urinary tract infection           Follow-ups after your visit        Additional Services     UROLOGY ADULT REFERRAL       Your provider has referred you to: Dr. Isela Cleveland [address]    Please be aware that coverage of these services is subject to the terms and limitations of your health insurance plan.  Call member services at your health plan with any benefit or coverage questions.      Please bring the following with you to your appointment:    (1) Any X-Rays, CTs or MRIs which have been performed.  Contact the facility where they were done to arrange for  prior to your scheduled appointment.    (2) List of current medications  (3) This referral request   (4) Any documents/labs given to you for this referral                  Who to contact     If you have questions or need follow up information about today's clinic visit or your schedule please contact [facility] directly at [phone].  Normal or non-critical lab and imaging results will be communicated to you by MyChart, letter or phone within 4 business days after the clinic has received the results. If you do not hear from us within 7 days, please contact the clinic through MyChart or phone. If you have a critical or abnormal lab result, we will notify you by phone as soon as possible.  Submit refill requests through [employer] or call your pharmacy and they will forward the refill request to us. Please allow 3 business days for your refill to be completed.          Additional Information About Your Visit        Care EveryWhere ID      "This is your Care EveryWhere ID. This could be used by other organizations to access your [place] medical records  [id number]        Your Vitals Were     Pulse Temperature Respirations Height Pulse Oximetry BMI (Body Mass Index)    85 98.6  F (37  C) (Tympanic) 16 5' 8\" (1.727 m) 98% 26.85 kg/m2       Blood Pressure from Last 3 Encounters:   06/09/17 134/83   06/01/17 126/70   05/26/17 148/83    Weight from Last 3 Encounters:   06/09/17 176 lb 9.6 oz (80.1 kg)   06/01/17 175 lb (79.4 kg)   05/26/17 175 lb 12.8 oz (79.7 kg)              We Performed the Following     UROLOGY ADULT REFERRAL        Primary Care Provider Office Phone # Fax #    Paddy Ibrahim [phone]       [address] [address]        Thank you!     Thank you for choosing [facility]  for your care. Our goal is always to provide you with excellent care. Hearing back from our patients is one way we can continue to improve our services. Please take a few minutes to complete the written survey that you may receive in the mail after your visit with us. Thank you!             Your Updated Medication List - Protect others around you: Learn how to safely use, store and throw away your medicines at www.disposemymeds.org.          This list is accurate as of: 6/9/17  4:28 PM.  Always use your most recent med list.                   Brand Name Dispense Instructions for use    aspirin 81 MG tablet      Take 1 tablet by mouth daily.       cefpodoxime 100 MG tablet    VANTIN     Take 100 mg by mouth 2 times daily       clonazePAM 0.5 MG tablet    klonoPIN     Take 0.5 mg by mouth 1/2 tablet at bedtime       CO Q-10 PO      Take 1 tablet by mouth daily.       fluconazole 150 MG tablet    DIFLUCAN    4 tablet    Take 1 tablet (150 mg) by mouth every 3 days       GABAPENTIN PO      Take 300 mg by mouth 2 times daily       GARLIC PO      Take 1 tablet by mouth daily       KRILL OIL OMEGA-3 PO "      Take by mouth 2 times daily       LIPITOR PO      Take 10 mg by mouth daily       Multi-vitamin Tabs tablet      Take 1 tablet by mouth daily       NASACORT AQ NA      [place] 1 spray in nostril daily       phenazopyridine 100 MG tablet    PYRIDIUM     Take 100 mg by mouth 3 times daily After meals for 5 days       PROBIOTIC DAILY PO      Take 1 capsule by mouth daily       sodium chloride 0.65 % nasal spray    OCEAN     Spray 1 spray into both nostrils daily as needed for congestion       TUMS PO      Take by mouth as needed       UNABLE TO FIND      Take by mouth 3 times daily as needed MEDICATION NAME: Bone maximizer 3       VITAMIN C PO      Take 500 mg by mouth 2 times daily

## 2017-06-15 ENCOUNTER — TRANSFERRED RECORDS (OUTPATIENT)
Dept: HEALTH INFORMATION MANAGEMENT | Facility: HOSPITAL | Age: 67
End: 2017-06-15

## 2017-08-08 ENCOUNTER — OFFICE VISIT (OUTPATIENT)
Dept: FAMILY MEDICINE | Facility: OTHER | Age: 67
End: 2017-08-08
Attending: PHYSICIAN ASSISTANT
Payer: COMMERCIAL

## 2017-08-08 VITALS
TEMPERATURE: 98.6 F | HEART RATE: 62 BPM | BODY MASS INDEX: 26.67 KG/M2 | HEIGHT: 68 IN | DIASTOLIC BLOOD PRESSURE: 76 MMHG | WEIGHT: 176 LBS | OXYGEN SATURATION: 99 % | SYSTOLIC BLOOD PRESSURE: 118 MMHG | RESPIRATION RATE: 16 BRPM

## 2017-08-08 DIAGNOSIS — R10.84 ABDOMINAL PAIN, GENERALIZED: Primary | ICD-10-CM

## 2017-08-08 LAB
BASOPHILS # BLD AUTO: 0 10E9/L (ref 0–0.2)
BASOPHILS NFR BLD AUTO: 0.4 %
DIFFERENTIAL METHOD BLD: ABNORMAL
EOSINOPHIL # BLD AUTO: 0.4 10E9/L (ref 0–0.7)
EOSINOPHIL NFR BLD AUTO: 4.3 %
ERYTHROCYTE [DISTWIDTH] IN BLOOD BY AUTOMATED COUNT: 12.4 % (ref 10–15)
HCT VFR BLD AUTO: 34.9 % (ref 35–47)
HGB BLD-MCNC: 11.6 G/DL (ref 11.7–15.7)
LYMPHOCYTES # BLD AUTO: 2 10E9/L (ref 0.8–5.3)
LYMPHOCYTES NFR BLD AUTO: 24.9 %
MCH RBC QN AUTO: 29.4 PG (ref 26.5–33)
MCHC RBC AUTO-ENTMCNC: 33.2 G/DL (ref 31.5–36.5)
MCV RBC AUTO: 89 FL (ref 78–100)
MONOCYTES # BLD AUTO: 0.6 10E9/L (ref 0–1.3)
MONOCYTES NFR BLD AUTO: 7.7 %
NEUTROPHILS # BLD AUTO: 5.1 10E9/L (ref 1.6–8.3)
NEUTROPHILS NFR BLD AUTO: 62.7 %
PLATELET # BLD AUTO: 322 10E9/L (ref 150–450)
RBC # BLD AUTO: 3.94 10E12/L (ref 3.8–5.2)
WBC # BLD AUTO: 8.1 10E9/L (ref 4–11)

## 2017-08-08 PROCEDURE — 99212 OFFICE O/P EST SF 10 MIN: CPT

## 2017-08-08 PROCEDURE — 99213 OFFICE O/P EST LOW 20 MIN: CPT | Performed by: PHYSICIAN ASSISTANT

## 2017-08-08 PROCEDURE — 36415 COLL VENOUS BLD VENIPUNCTURE: CPT | Mod: ZL | Performed by: PHYSICIAN ASSISTANT

## 2017-08-08 PROCEDURE — 85025 COMPLETE CBC W/AUTO DIFF WBC: CPT | Mod: ZL | Performed by: PHYSICIAN ASSISTANT

## 2017-08-08 RX ORDER — NITROFURANTOIN 25; 75 MG/1; MG/1
100 CAPSULE ORAL DAILY
COMMUNITY
End: 2019-07-01

## 2017-08-08 ASSESSMENT — ANXIETY QUESTIONNAIRES
3. WORRYING TOO MUCH ABOUT DIFFERENT THINGS: NOT AT ALL
6. BECOMING EASILY ANNOYED OR IRRITABLE: NOT AT ALL
5. BEING SO RESTLESS THAT IT IS HARD TO SIT STILL: NOT AT ALL
GAD7 TOTAL SCORE: 0
7. FEELING AFRAID AS IF SOMETHING AWFUL MIGHT HAPPEN: NOT AT ALL
1. FEELING NERVOUS, ANXIOUS, OR ON EDGE: NOT AT ALL
2. NOT BEING ABLE TO STOP OR CONTROL WORRYING: NOT AT ALL

## 2017-08-08 ASSESSMENT — PAIN SCALES - GENERAL: PAINLEVEL: MODERATE PAIN (4)

## 2017-08-08 ASSESSMENT — PATIENT HEALTH QUESTIONNAIRE - PHQ9
5. POOR APPETITE OR OVEREATING: NOT AT ALL
SUM OF ALL RESPONSES TO PHQ QUESTIONS 1-9: 0

## 2017-08-08 NOTE — NURSING NOTE
"Chief Complaint   Patient presents with     Wound Check     had a bladder repair done at [place] on 7/31/17 - had a sore area around the incision site yesterday but not too bad today and she just wants it checked       Initial /76 (BP Location: Right arm, Patient Position: Chair, Cuff Size: Adult Large)  Pulse 62  Temp 98.6  F (37  C) (Tympanic)  Resp 16  Ht 5' 8\" (1.727 m)  Wt 176 lb (79.8 kg)  SpO2 99%  BMI 26.76 kg/m2 Estimated body mass index is 26.76 kg/(m^2) as calculated from the following:    Height as of this encounter: 5' 8\" (1.727 m).    Weight as of this encounter: 176 lb (79.8 kg).  Medication Reconciliation: complete   Earlene Ross LPN      "

## 2017-08-08 NOTE — PROGRESS NOTES
Subjective:  Sammie Heaton is a 67 year old female  who presents for f/u of bladder surgery at [place] 7-31-17. She has a supra-pubic catheter. During day she is to clamp catheter for 4 hours at a time to see if she will be able to urinate on her own. She has not been able to urinate on her own yet. She talked to her surgeon's office. She has decreased her fluid intake to 60-80 oz daily. She has felt fatigued. No fever, nausea or vomiting.    Active diagnoses this visit:  Data Unavailable    Past Medical History:   Diagnosis Date     Abdominal pain, unspecified site 09/19/2001     Cystitis, unspecified 05/12/2003     Dermatophytosis of the body 11/07/2006     Follow-up examination, following unspecified surgery 03/26/2003     Hemorrhage of rectum and anus 10/20/2005     Nonspecific abnormal findings on radiological and 06/27/2000     Osteoporosis, unspecified 07/26/2000     Other abnormal findings on radiological examinatio 03/18/2002     Other screening mammogram 09/11/2001     Routine general medical examination at a health care facility 06/14/2000     Sebaceous cyst 03/10/2003     Unspecified ulcer of lower limb 11/06/2003     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) 12/03/2003     Venous (peripheral) insufficiency, unspecified 05/27/2004       Past Surgical History:   Procedure Laterality Date     BLADDER SURGERY  07/31/2017    bladder repair done at [place]     COLONOSCOPY  11-     ENT SURGERY      tonsillectomy     GYN SURGERY  1987    hysterectomy     GYN SURGERY  2014    right ovary removed at [place]     PHACOEMULSIFICATION WITH STANDARD INTRAOCULAR LENS IMPLANT Left 9/10/2015    Procedure: PHACOEMULSIFICATION WITH STANDARD INTRAOCULAR LENS IMPLANT;  Surgeon: Ray Lucero MD;  Location: [facility] OR     PHACOEMULSIFICATION WITH STANDARD INTRAOCULAR LENS IMPLANT Right 9/24/2015    Procedure: PHACOEMULSIFICATION WITH STANDARD INTRAOCULAR LENS IMPLANT;  Surgeon: Ray Lucero MD;  Location: [facility] OR        Family History   Problem Relation Age of Onset     HEART DISEASE Mother      C.A.D. Mother      HEART DISEASE Father      MI     Blood Disease Father      Breast Cancer Sister        Current Outpatient Prescriptions   Medication     nitroFURantoin, macrocrystal-monohydrate, (MACROBID) 100 MG capsule     clonazePAM (KLONOPIN) 0.5 MG tablet     Calcium Carbonate Antacid (TUMS PO)     Probiotic Product (PROBIOTIC DAILY PO)     Atorvastatin Calcium (LIPITOR PO)     multivitamin, therapeutic with minerals (MULTI-VITAMIN) TABS tablet     Ascorbic Acid (VITAMIN C PO)     UNABLE TO FIND     sodium chloride (OCEAN) 0.65 % nasal spray     Triamcinolone Acetonide (NASACORT AQ NA)     KRILL OIL OMEGA-3 PO     GARLIC PO     GABAPENTIN PO     aspirin 81 MG tablet     Coenzyme Q10 (CO Q-10 PO)     No current facility-administered medications for this visit.        Allergies   Allergen Reactions     Codeine      constipation     Sulfa Drugs Other (See Comments)     Flush; sulfonamide antibiotics       Review of Systems:  Gen: negative for fever, change in weight  Resp: negative for significant cough, SOB or wheeze  CV: negative for chest pain, palpitations   GI: negative for  nausea, vomiting, diarrhea  Psych: negative for changes in mood or affect    Objective:    B/P: 118/76, T: 98.6, P: 62, R: 16    Physical Exam:  Constitutional: healthy, alert and no acute distress  ENT: Posterior pharynx moist and pink without edema or exudate.  EAC's clear. TM's intact bilateral.  CV: RRR. No murmur  Pulm: Lungs clear to auscultation without wheeze, rales or rhonchi  GI: Abdomen soft, non-tender. BS normal. Supra-pubic catheter. Mild pink area around catheter site.  Psych: mentation and affect appear normal      Assessment/Plan    (R10.84) Abdominal pain, generalized  (primary encounter diagnosis)  Comment:CBC normal. Monitor area around catheter for any increasing redness.  Plan: CBC with platelets and differential                    Follow up if symptoms persist or worsen.      Chen Rand, PAC

## 2017-08-08 NOTE — MR AVS SNAPSHOT
"              After Visit Summary   8/8/2017    Sammie Heaton    MRN: [id number]           Patient Information     Date Of Birth          1950        Visit Information        Provider Department      8/8/2017 11:00 AM Chen Rand PA [facility]        Today's Diagnoses     Abdominal pain, generalized    -  1       Follow-ups after your visit        Who to contact     If you have questions or need follow up information about today's clinic visit or your schedule please contact [facility] directly at [phone].  Normal or non-critical lab and imaging results will be communicated to you by MyChart, letter or phone within 4 business days after the clinic has received the results. If you do not hear from us within 7 days, please contact the clinic through MyChart or phone. If you have a critical or abnormal lab result, we will notify you by phone as soon as possible.  Submit refill requests through [employer] or call your pharmacy and they will forward the refill request to us. Please allow 3 business days for your refill to be completed.          Additional Information About Your Visit        Care EveryWhere ID     This is your Care EveryWhere ID. This could be used by other organizations to access your [place] medical records  [id number]        Your Vitals Were     Pulse Temperature Respirations Height Pulse Oximetry BMI (Body Mass Index)    62 98.6  F (37  C) (Tympanic) 16 5' 8\" (1.727 m) 99% 26.76 kg/m2       Blood Pressure from Last 3 Encounters:   08/08/17 118/76   06/09/17 134/83   06/01/17 126/70    Weight from Last 3 Encounters:   08/08/17 176 lb (79.8 kg)   06/09/17 176 lb 9.6 oz (80.1 kg)   06/01/17 175 lb (79.4 kg)              We Performed the Following     CBC with platelets and differential        Primary Care Provider Office Phone # Fax #    Paddy Ibraihm [phone]       [address] [address]      "   Equal Access to Services     [facility] Center[facility]IGUEL : Hadii aad ku hadjolynndenise Pavelali, waaxda luqadaha, qaybta kaalmaemma sabillon, mckay barry. So [facility] [phone].    ATENCIÓN: Si habla español, tiene a brown disposición servicios gratuitos de asistencia lingüística. Ninaame al [phone].    We comply with applicable federal civil rights laws and [place] laws. We do not discriminate on the basis of race, color, national origin, age, disability sex, sexual orientation or gender identity.            Thank you!     Thank you for choosing [facility]  for your care. Our goal is always to provide you with excellent care. Hearing back from our patients is one way we can continue to improve our services. Please take a few minutes to complete the written survey that you may receive in the mail after your visit with us. Thank you!             Your Updated Medication List - Protect others around you: Learn how to safely use, store and throw away your medicines at www.disposemymeds.org.          This list is accurate as of: 8/8/17 12:47 PM.  Always use your most recent med list.                   Brand Name Dispense Instructions for use Diagnosis    aspirin 81 MG tablet      Take 1 tablet by mouth daily.        clonazePAM 0.5 MG tablet    klonoPIN     Take 0.5 mg by mouth 1/2 tablet at bedtime        CO Q-10 PO      Take 1 tablet by mouth daily.        GABAPENTIN PO      Take 300 mg by mouth 2 times daily        GARLIC PO      Take 1 tablet by mouth daily        KRILL OIL OMEGA-3 PO      Take by mouth 2 times daily        LIPITOR PO      Take 10 mg by mouth daily        MACROBID 100 MG capsule   Generic drug:  nitroFURantoin (macrocrystal-monohydrate)      Take 100 mg by mouth daily        Multi-vitamin Tabs tablet      Take 1 tablet by mouth daily        NASACORT AQ NA      [place] 1 spray in nostril daily        PROBIOTIC DAILY PO      Take 1 capsule by mouth daily        sodium chloride  0.65 % nasal spray    OCEAN     Spray 1 spray into both nostrils daily as needed for congestion        TUMS PO      Take by mouth as needed        UNABLE TO FIND      Take by mouth 3 times daily as needed MEDICATION NAME: Bone maximizer 3        VITAMIN C PO      Take 500 mg by mouth 2 times daily

## 2017-08-09 ASSESSMENT — ANXIETY QUESTIONNAIRES: GAD7 TOTAL SCORE: 0

## 2017-08-11 ENCOUNTER — OFFICE VISIT (OUTPATIENT)
Dept: INTERNAL MEDICINE | Facility: OTHER | Age: 67
End: 2017-08-11
Attending: PHYSICIAN ASSISTANT
Payer: MEDICARE

## 2017-08-11 ENCOUNTER — TELEPHONE (OUTPATIENT)
Dept: FAMILY MEDICINE | Facility: OTHER | Age: 67
End: 2017-08-11

## 2017-08-11 DIAGNOSIS — R39.15 URINARY URGENCY: Primary | ICD-10-CM

## 2017-08-11 DIAGNOSIS — N32.9 BLADDER PROBLEM: Primary | ICD-10-CM

## 2017-08-11 LAB
ALBUMIN UR-MCNC: NEGATIVE MG/DL
APPEARANCE UR: CLEAR
BACTERIA #/AREA URNS HPF: ABNORMAL /HPF
BILIRUB UR QL STRIP: NEGATIVE
COLOR UR AUTO: YELLOW
GLUCOSE UR STRIP-MCNC: NEGATIVE MG/DL
HGB UR QL STRIP: ABNORMAL
KETONES UR STRIP-MCNC: NEGATIVE MG/DL
LEUKOCYTE ESTERASE UR QL STRIP: ABNORMAL
MUCOUS THREADS #/AREA URNS LPF: PRESENT /LPF
NITRATE UR QL: NEGATIVE
PH UR STRIP: 5.5 PH (ref 4.7–8)
RBC #/AREA URNS AUTO: 37 /HPF (ref 0–2)
SP GR UR STRIP: 1.01 (ref 1–1.03)
SQUAMOUS #/AREA URNS AUTO: 3 /HPF (ref 0–1)
URN SPEC COLLECT METH UR: ABNORMAL
UROBILINOGEN UR STRIP-MCNC: NORMAL MG/DL (ref 0–2)
WBC #/AREA URNS AUTO: 18 /HPF (ref 0–2)

## 2017-08-11 PROCEDURE — 81001 URINALYSIS AUTO W/SCOPE: CPT | Mod: ZL | Performed by: OBSTETRICS & GYNECOLOGY

## 2017-08-11 PROCEDURE — 87086 URINE CULTURE/COLONY COUNT: CPT | Mod: ZL | Performed by: OBSTETRICS & GYNECOLOGY

## 2017-08-11 NOTE — TELEPHONE ENCOUNTER
"10:42 AM    Reason for Call: Phone Call    Description: patient has an appt this afternoon (per [facility] one was needed) for cath urine spc: urinary culture with sensitivity, patient wondering if an \"order\" is needed.  Please call and leave message if she does not     Was an appointment offered for this call? No    Preferred method for responding to this message: Telephone Call    If we cannot reach you directly, may we leave a detailed response at the number you provided? Yes    Can this message wait until your PCP/provider returns, if available today? Not applicable, provider in    Maylin Alfred    "

## 2017-08-11 NOTE — MR AVS SNAPSHOT
After Visit Summary   8/11/2017    Sammie Heaton    MRN: [id number]           Patient Information     Date Of Birth          1950        Visit Information        Provider Department      8/11/2017 2:00 PM HC [facility]        Today's Diagnoses     Bladder problem    -  1       Follow-ups after your visit        Who to contact     If you have questions or need follow up information about today's clinic visit or your schedule please [facility] at [phone].  Normal or non-critical lab and imaging results will be communicated to you by MyChart, letter or phone within 4 business days after the clinic has received the results. If you do not hear from us within 7 days, please contact the clinic through MyChart or phone. If you have a critical or abnormal lab result, we will notify you by phone as soon as possible.  Submit refill requests through Conformity or call your pharmacy and they will forward the refill request to us. Please allow 3 business days for your refill to be completed.          Additional Information About Your Visit        Care EveryWhere ID     This is your Care EveryWhere ID. This could be used by other organizations to access your [place] medical records  [id number]         Blood Pressure from Last 3 Encounters:   08/08/17 118/76   06/09/17 134/83   06/01/17 126/70    Weight from Last 3 Encounters:   08/08/17 176 lb (79.8 kg)   06/09/17 176 lb 9.6 oz (80.1 kg)   06/01/17 175 lb (79.4 kg)              Today, you had the following     No orders found for display       Primary Care Provider Office Phone # Fax #    Paddy Ibrahim [phone]       [address] [address]        Equal Access to Services     [facility] : Hadii blair Jacobo, waaxda luqmary, qaybta kaalmckay otero . So [facility] [phone].    ATENCIÓN: Si  marc hernandez, tiene a brown disposición servicios gratuitos de asistencia lingüística. Deion jerome [phone].    We comply with applicable federal civil rights laws and [place] laws. We do not discriminate on the basis of race, color, national origin, age, disability sex, sexual orientation or gender identity.            Thank you!     Thank you for choosing [facility]  [facility] your [facility]. Our goal is always to provide you with excellent care. Hearing back from our patients is one way we can continue to improve our services. Please take a few minutes to complete the written survey that you may receive in the mail after your visit with us. Thank you!             Your Updated Medication List - Protect others around you: Learn how to safely use, store and throw away your medicines at www.disposemymeds.org.          This list is accurate as of: 8/11/17  2:52 PM.  Always use your most recent med list.                   Brand Name Dispense Instructions for use Diagnosis    aspirin 81 MG tablet      Take 1 tablet by mouth daily.        clonazePAM 0.5 MG tablet    klonoPIN     Take 0.5 mg by mouth 1/2 tablet at bedtime        CO Q-10 PO      Take 1 tablet by mouth daily.        GABAPENTIN PO      Take 300 mg by mouth 2 times daily        GARLIC PO      Take 1 tablet by mouth daily        KRILL OIL OMEGA-3 PO      Take by mouth 2 times daily        LIPITOR PO      Take 10 mg by mouth daily        MACROBID 100 MG capsule   Generic drug:  nitroFURantoin (macrocrystal-monohydrate)      Take 100 mg by mouth daily        Multi-vitamin Tabs tablet      Take 1 tablet by mouth daily        NASACORT AQ NA      [place] 1 spray in nostril daily        PROBIOTIC DAILY PO      Take 1 capsule by mouth daily        sodium chloride 0.65 % nasal spray    OCEAN     Spray 1 spray into both nostrils daily as needed for congestion        TUMS PO      Take by mouth as needed        UNABLE TO FIND      Take by mouth 3 times daily as needed  MEDICATION NAME: Bone maximizer 3        VITAMIN C PO      Take 500 mg by mouth 2 times daily

## 2017-08-11 NOTE — PROGRESS NOTES
"  Catheter insertion documentation on 8/11/2017:    Sammie Heaton presents to the clinic for catheter insertion.  Reason for insertion: per Dr. Olea at the [facility]  Order has been verified. By lab and Debbie Gonzalez LPN    Catheter successfully inserted into the urethral meatus in the usual sterile fashion without immediate complication.  Type of catheter placed: straight cath straight catheter  Urine is yellow in color.   cc's of urine output returned.  No Balloon was inserted.  The patient tolerated the procedure    On left labia pt had a white sore about 2mm oval shaped, pt states is has been there since yesterday- states it is painful- would like to know if there is a cream or ointment she can put on it. Will notify Chen Nicole     Pt also states \"towards the back\" is itching - I did notice irritation/red all through vaginal and anal area also had a strong odor - pt states she is washing bottom twice a day.     Debbie Gonzalez LPN        "

## 2017-08-11 NOTE — PROGRESS NOTES
I do not know what this could be without seeing it, but could try A&D ungt or similar which may be soothing.

## 2017-08-11 NOTE — Clinical Note
Please read my note- pt would like to know what to do about irritation and sore. Please have your nurse call the patient and advise. I will make sure that the lab results are faxed to Dr. Olea's services at the [facility]. Thank you. Debbie Gonzalez LPN

## 2017-08-14 LAB
BACTERIA SPEC CULT: NO GROWTH
MICRO REPORT STATUS: NORMAL
SPECIMEN SOURCE: NORMAL

## 2017-08-22 ENCOUNTER — ALLIED HEALTH/NURSE VISIT (OUTPATIENT)
Dept: PEDIATRICS | Facility: OTHER | Age: 67
End: 2017-08-22
Attending: FAMILY MEDICINE
Payer: MEDICARE

## 2017-08-22 ENCOUNTER — MEDICAL CORRESPONDENCE (OUTPATIENT)
Dept: HEALTH INFORMATION MANAGEMENT | Facility: HOSPITAL | Age: 67
End: 2017-08-22

## 2017-08-22 ENCOUNTER — TELEPHONE (OUTPATIENT)
Dept: INTERNAL MEDICINE | Facility: OTHER | Age: 67
End: 2017-08-22

## 2017-08-22 DIAGNOSIS — N39.0 URINARY TRACT INFECTION: ICD-10-CM

## 2017-08-22 DIAGNOSIS — R39.15 URGENCY OF URINATION: Primary | ICD-10-CM

## 2017-08-22 LAB
ALBUMIN UR-MCNC: NEGATIVE MG/DL
APPEARANCE UR: CLEAR
BILIRUB UR QL STRIP: NEGATIVE
COLOR UR AUTO: YELLOW
GLUCOSE UR STRIP-MCNC: NEGATIVE MG/DL
HGB UR QL STRIP: NEGATIVE
KETONES UR STRIP-MCNC: NEGATIVE MG/DL
LEUKOCYTE ESTERASE UR QL STRIP: NEGATIVE
NITRATE UR QL: NEGATIVE
PH UR STRIP: 7 PH (ref 4.7–8)
SOURCE: NORMAL
SP GR UR STRIP: 1.01 (ref 1–1.03)
UROBILINOGEN UR STRIP-MCNC: NORMAL MG/DL (ref 0–2)

## 2017-08-22 PROCEDURE — 81003 URINALYSIS AUTO W/O SCOPE: CPT | Mod: ZL | Performed by: OBSTETRICS & GYNECOLOGY

## 2017-08-22 PROCEDURE — 87086 URINE CULTURE/COLONY COUNT: CPT | Mod: ZL | Performed by: OBSTETRICS & GYNECOLOGY

## 2017-08-22 NOTE — NURSING NOTE
"  Catheter insertion documentation on 8/22/2017:    Sammie Heaton presents to the clinic for catheter insertion.  Reason for insertion: { :771816}  Order has been verified. ***  Catheter successfully inserted into the urethral meatus in the usual sterile fashion without immediate complication.  Type of catheter placed: { :458551::\"16 Kyrgyz\"} { :680232::\"indwelling\"} catheter  Urine is { :401279} in color.  *** cc's of urine output returned.  Balloon was filled with 10***cc's of normal saline.  Securement device placed for the catheter.  The patient tolerated the procedure and was instructed to { :262354}    Bruna Clarke    "

## 2017-08-22 NOTE — TELEPHONE ENCOUNTER
11:26 AM    Reason for Call: Phone Call    Description: Patient needs to come in 8-22-17 and have a catherization done. If you could call patient back at your earliest convenience [phone]    Was an appointment offered for this call? No  If yes : Appointment type              Date    Preferred method for responding to this message: Telephone Call  What is your phone number ? [phone]    If we cannot reach you directly, may we leave a detailed response at the number you provided? Yes    Can this message wait until your PCP/provider returns, if available today? YES    Anette Marmolejo

## 2017-08-22 NOTE — MR AVS SNAPSHOT
After Visit Summary   8/22/2017    Sammie Heaton    MRN: [id number]           Patient Information     Date Of Birth          1950        Visit Information        Provider Department      8/22/2017 2:45 PM HC IM PEDS NURSE [facility]        Today's Diagnoses     Urgency of urination    -  1    Urinary tract infection           Follow-ups after your visit        Who to contact     If you have questions or need follow up information about today's clinic visit or your schedule please contact [facility] directly at [phone].  Normal or non-critical lab and imaging results will be communicated to you by MyChart, letter or phone within 4 business days after the clinic has received the results. If you do not hear from us within 7 days, please contact the clinic through MyChart or phone. If you have a critical or abnormal lab result, we will notify you by phone as soon as possible.  Submit refill requests through ClassPass or call your pharmacy and they will forward the refill request to us. Please allow 3 business days for your refill to be completed.          Additional Information About Your Visit        Care EveryWhere ID     This is your Care EveryWhere ID. This could be used by other organizations to access your [place] medical records  [id number]         Blood Pressure from Last 3 Encounters:   08/08/17 118/76   06/09/17 134/83   06/01/17 126/70    Weight from Last 3 Encounters:   08/08/17 176 lb (79.8 kg)   06/09/17 176 lb 9.6 oz (80.1 kg)   06/01/17 175 lb (79.4 kg)              We Performed the Following     Straight cath for urine     UA reflex to Microscopic and Culture - [place]     Urine Culture Aerobic Bacterial        Primary Care Provider Office Phone # Fax #    Paddy Ibrahim [phone]       [address] [address]        Equal Access to Services     MARIALUISA LOWE AH: Jarrell Jacobo,  waaxda luqadaha, qaybta kaalmada ridge, mckay ratliffaamagnolia ah. So [facility] [phone].    ATENCIÓN: Si marc hernandez, tiene a brown disposición servicios gratuitos de asistencia lingüística. Deion al [phone].    We comply with applicable federal civil rights laws and [place] laws. We do not discriminate on the basis of race, color, national origin, age, disability sex, sexual orientation or gender identity.            Thank you!     Thank you for choosing [facility]  for your [facility]. Our goal is always to provide you with excellent care. Hearing back from our patients is one way we can continue to improve our services. Please take a few minutes to complete the written survey that you may receive in the mail after your visit with us. Thank you!             Your Updated Medication List - Protect others around you: Learn how to safely use, store and throw away your medicines at www.disposemymeds.org.          This list is accurate as of: 8/22/17  3:46 PM.  Always use your most recent med list.                   Brand Name Dispense Instructions for use Diagnosis    aspirin 81 MG tablet      Take 1 tablet by mouth daily.        clonazePAM 0.5 MG tablet    klonoPIN     Take 0.5 mg by mouth 1/2 tablet at bedtime        CO Q-10 PO      Take 1 tablet by mouth daily.        GABAPENTIN PO      Take 300 mg by mouth 2 times daily        GARLIC PO      Take 1 tablet by mouth daily        KRILL OIL OMEGA-3 PO      Take by mouth 2 times daily        LIPITOR PO      Take 10 mg by mouth daily        MACROBID 100 MG capsule   Generic drug:  nitroFURantoin (macrocrystal-monohydrate)      Take 100 mg by mouth daily        Multi-vitamin Tabs tablet      Take 1 tablet by mouth daily        NASACORT AQ NA      [place] 1 spray in nostril daily        PROBIOTIC DAILY PO      Take 1 capsule by mouth daily        sodium chloride 0.65 % nasal spray    OCEAN     Spray 1 spray into both nostrils daily as needed  for congestion        TUMS PO      Take by mouth as needed        UNABLE TO FIND      Take by mouth 3 times daily as needed MEDICATION NAME: Bone maximizer 3        VITAMIN C PO      Take 500 mg by mouth 2 times daily

## 2017-08-22 NOTE — NURSING NOTE
Catheter insertion documentation on 8/22/2017:    Sammie Heaton presents to the clinic for catheter insertion - straight cath for UA specimen  Reason for insertion: UA specimen  Order has been verified. Fax from [facility] in lab  Catheter successfully inserted into the urethral meatus in the usual sterile fashion without immediate complication, along with assistant Sherita CORTEZ and writer present.  Type of catheter placed: Straight cath inserted with no problems got return of only a few drops of dorothy urine, So she drank 64 oz of H2O and let patient wait 20 minutes.  and Sherita CORTEZ proceeded to instert another straight cath under sterile conditions and got vial of 30 cc of dorothy urine, straight cath removed. Patient tolerated procedure.   Urine is dorothy in color.  30 cc's of urine output returned.  The patient tolerated the procedure and was instructed to monitor for pain or discomfort and watch for signs of infection    Bruna Clarke LPN

## 2017-08-24 LAB
BACTERIA SPEC CULT: NO GROWTH
SPECIMEN SOURCE: NORMAL

## 2018-07-09 ENCOUNTER — OFFICE VISIT (OUTPATIENT)
Dept: FAMILY MEDICINE | Facility: OTHER | Age: 68
End: 2018-07-09
Attending: FAMILY MEDICINE
Payer: COMMERCIAL

## 2018-07-09 VITALS
SYSTOLIC BLOOD PRESSURE: 122 MMHG | HEART RATE: 76 BPM | OXYGEN SATURATION: 97 % | HEIGHT: 67 IN | BODY MASS INDEX: 27.59 KG/M2 | DIASTOLIC BLOOD PRESSURE: 70 MMHG | TEMPERATURE: 97.8 F | WEIGHT: 175.8 LBS

## 2018-07-09 DIAGNOSIS — R63.2 OVEREATING: ICD-10-CM

## 2018-07-09 DIAGNOSIS — R82.79 ABNORMAL FINDINGS ON MICROBIOLOGICAL EXAMINATION OF URINE: ICD-10-CM

## 2018-07-09 DIAGNOSIS — N30.00 ACUTE CYSTITIS WITHOUT HEMATURIA: ICD-10-CM

## 2018-07-09 DIAGNOSIS — R30.0 DYSURIA: Primary | ICD-10-CM

## 2018-07-09 LAB
ALBUMIN UR-MCNC: NEGATIVE MG/DL
APPEARANCE UR: ABNORMAL
BACTERIA #/AREA URNS HPF: ABNORMAL /HPF
BILIRUB UR QL STRIP: NEGATIVE
COLOR UR AUTO: YELLOW
GLUCOSE UR STRIP-MCNC: NEGATIVE MG/DL
HGB UR QL STRIP: NEGATIVE
KETONES UR STRIP-MCNC: NEGATIVE MG/DL
LEUKOCYTE ESTERASE UR QL STRIP: ABNORMAL
NITRATE UR QL: NEGATIVE
NON-SQ EPI CELLS #/AREA URNS LPF: ABNORMAL /LPF
PH UR STRIP: 7.5 PH (ref 5–7)
RBC #/AREA URNS AUTO: ABNORMAL /HPF
SOURCE: ABNORMAL
SP GR UR STRIP: 1.01 (ref 1–1.03)
UROBILINOGEN UR STRIP-ACNC: 0.2 EU/DL (ref 0.2–1)
WBC #/AREA URNS AUTO: ABNORMAL /HPF

## 2018-07-09 PROCEDURE — 81001 URINALYSIS AUTO W/SCOPE: CPT | Mod: ZL | Performed by: FAMILY MEDICINE

## 2018-07-09 PROCEDURE — G0463 HOSPITAL OUTPT CLINIC VISIT: HCPCS

## 2018-07-09 PROCEDURE — 99214 OFFICE O/P EST MOD 30 MIN: CPT | Performed by: FAMILY MEDICINE

## 2018-07-09 PROCEDURE — 87086 URINE CULTURE/COLONY COUNT: CPT | Mod: ZL | Performed by: FAMILY MEDICINE

## 2018-07-09 RX ORDER — CIPROFLOXACIN 500 MG/1
500 TABLET, FILM COATED ORAL 2 TIMES DAILY
Qty: 14 TABLET | Refills: 0 | Status: SHIPPED | OUTPATIENT
Start: 2018-07-09 | End: 2019-07-01

## 2018-07-09 ASSESSMENT — ANXIETY QUESTIONNAIRES
3. WORRYING TOO MUCH ABOUT DIFFERENT THINGS: NOT AT ALL
7. FEELING AFRAID AS IF SOMETHING AWFUL MIGHT HAPPEN: NOT AT ALL
1. FEELING NERVOUS, ANXIOUS, OR ON EDGE: NOT AT ALL
6. BECOMING EASILY ANNOYED OR IRRITABLE: NOT AT ALL
GAD7 TOTAL SCORE: 0
2. NOT BEING ABLE TO STOP OR CONTROL WORRYING: NOT AT ALL
5. BEING SO RESTLESS THAT IT IS HARD TO SIT STILL: NOT AT ALL

## 2018-07-09 ASSESSMENT — PATIENT HEALTH QUESTIONNAIRE - PHQ9: 5. POOR APPETITE OR OVEREATING: NOT AT ALL

## 2018-07-09 ASSESSMENT — PAIN SCALES - GENERAL: PAINLEVEL: NO PAIN (0)

## 2018-07-09 NOTE — NURSING NOTE
"Chief Complaint   Patient presents with     UTI       Initial /70  Pulse 76  Temp 97.8  F (36.6  C) (Tympanic)  Ht 5' 7\" (1.702 m)  Wt 175 lb 12.8 oz (79.7 kg)  SpO2 97%  BMI 27.53 kg/m2 Estimated body mass index is 27.53 kg/(m^2) as calculated from the following:    Height as of this encounter: 5' 7\" (1.702 m).    Weight as of this encounter: 175 lb 12.8 oz (79.7 kg).  Medication Reconciliation: complete    Merissa Peralta LPN  "

## 2018-07-09 NOTE — MR AVS SNAPSHOT
After Visit Summary   7/9/2018    Sammie Heaton    MRN: [id number]           Patient Information     Date Of Birth          1950        Visit Information        Provider Department      7/9/2018 3:45 PM Paddy Ibrahim MD [facility]        Today's Diagnoses     Dysuria    -  1    Abnormal findings on microbiological examination of urine        Acute cystitis without hematuria        Overeating           Follow-ups after your visit        Additional Services     PSYCHOLOGY REFERRAL       Your provider has referred you to:  Here in town    Please be aware that coverage of these services is subject to the terms and limitations of your health insurance plan.  Call member services at your health plan with any benefit or coverage questions.      Please bring the following to your appointment:    >>   Any x-rays, CTs or MRIs which have been performed.  Contact the facility where they were done to arrange for  prior to your scheduled appointment.   >>   List of current medications   >>   This referral request   >>   Any documents/labs given to you for this referral                  Who to contact     If you have questions or need follow up information about today's clinic visit or your schedule please contact [facility] directly at [phone].  Normal or non-critical lab and imaging results will be communicated to you by MyChart, letter or phone within 4 business days after the clinic has received the results. If you do not hear from us within 7 days, please contact the clinic through MyChart or phone. If you have a critical or abnormal lab result, we will notify you by phone as soon as possible.  Submit refill requests through [employer]Boxt or call your pharmacy and they will forward the refill request to us. Please allow 3 business days for your refill to be completed.          Additional Information About Your Visit        Care EveryWhere ID     This is your Care  "EveryWhere ID. This could be used by other organizations to access your [place] medical records  [id number]        Your Vitals Were     Pulse Temperature Height Pulse Oximetry BMI (Body Mass Index)       76 97.8  F (36.6  C) (Tympanic) 5' 7\" (1.702 m) 97% 27.53 kg/m2        Blood Pressure from Last 3 Encounters:   07/09/18 122/70   08/08/17 118/76   06/09/17 134/83    Weight from Last 3 Encounters:   07/09/18 175 lb 12.8 oz (79.7 kg)   08/08/17 176 lb (79.8 kg)   06/09/17 176 lb 9.6 oz (80.1 kg)              We Performed the Following     *UA reflex to Microscopic and Culture - [facility] REFERRAL     Urine Culture Aerobic Bacterial     Urine Microscopic          Today's Medication Changes          These changes are accurate as of 7/9/18  4:14 PM.  If you have any questions, ask your nurse or doctor.               Start taking these medicines.        Dose/Directions    ciprofloxacin 500 MG tablet   Commonly known as:  CIPRO   Used for:  Acute cystitis without hematuria   Started by:  Paddy Ibrahim MD        Dose:  500 mg   Take 1 tablet (500 mg) by mouth 2 times daily   Quantity:  14 tablet   Refills:  0         Stop taking these medicines if you haven't already. Please contact your care team if you have questions.     clonazePAM 0.5 MG tablet   Commonly known as:  klonoPIN   Stopped by:  Paddy Ibrahim MD           GABAPENTIN PO   Stopped by:  Paddy Ibrahim MD           PROBIOTIC DAILY PO   Stopped by:  Paddy Ibrahim MD                Where to get your medicines      These medications were sent to [id number]  [place], [place] - [address] [facility] [address]  [address], [place] [address]     Phone:  [phone]     ciprofloxacin 500 MG tablet                Primary Care Provider Office Phone # Fax #    Paddy Ibrahim [phone]       [address] [place] [address]        Equal Access to Services     MARIALUISA LOWE AH: geo Hwang " yungaddis hernandez waxay idiin hayaan adeeg kharash la'aan ah. So [facility] [phone].    ATENCIÓN: Si marc hernandez, tiene a brown disposición servicios gratuitos de asistencia lingüística. Deion al [phone].    We comply with applicable federal civil rights laws and [place] laws. We do not discriminate on the basis of race, color, national origin, age, disability, sex, sexual orientation, or gender identity.            Thank you!     Thank you for choosing [facility]  for your care. Our goal is always to provide you with excellent care. Hearing back from our patients is one way we can continue to improve our services. Please take a few minutes to complete the written survey that you may receive in the mail after your visit with us. Thank you!             Your Updated Medication List - Protect others around you: Learn how to safely use, store and throw away your medicines at www.disposemymeds.org.          This list is accurate as of 7/9/18  4:14 PM.  Always use your most recent med list.                   Brand Name Dispense Instructions for use Diagnosis    aspirin 81 MG tablet      Take 1 tablet by mouth daily.        ciprofloxacin 500 MG tablet    CIPRO    14 tablet    Take 1 tablet (500 mg) by mouth 2 times daily    Acute cystitis without hematuria       CO Q-10 PO      Take 1 tablet by mouth daily.        GARLIC PO      Take 1 tablet by mouth daily        KRILL OIL OMEGA-3 PO      Take by mouth 2 times daily        LIPITOR PO      Take 10 mg by mouth daily        MACROBID 100 MG capsule   Generic drug:  nitroFURantoin (macrocrystal-monohydrate)      Take 100 mg by mouth daily        Multi-vitamin Tabs tablet      Take 1 tablet by mouth daily        NASACORT AQ NA      [place] 1 spray in nostril daily        sodium chloride 0.65 % nasal spray    OCEAN     Spray 1 spray into both nostrils daily as needed for congestion        TUMS PO      Take by mouth as needed        UNABLE TO  FIND      Take by mouth 3 times daily as needed MEDICATION NAME: Bone maximizer 3        VITAMIN C PO      Take 500 mg by mouth 2 times daily

## 2018-07-09 NOTE — PROGRESS NOTES
SUBJECTIVE:                                                    Sammie Heaton is a 68 year old female who presents to clinic today for the following health issues:    URINARY TRACT SYMPTOMS      Duration: 1 week    Description  dysuria, odor and back pain    Intensity:  moderate    Accompanying signs and symptoms:  Fever/chills: no   Flank pain no   Nausea and vomiting: no   Vaginal symptoms: odor  Abdominal/Pelvic Pain: YES    History  History of frequent UTI's: YES  History of kidney stones: no   Sexually Active: no   Possibility of pregnancy: No    Precipitating or alleviating factors: None    Therapies tried and outcome: none         PROBLEMS TO ADD ON...    Problem list and histories reviewed & adjusted, as indicated.  Additional history: discussion today, somewhat lengthy.  Has trouble with overeating and also with not staying motivated to exercise.  It effects her scores on her MICKIE form today.      Patient Active Problem List   Diagnosis     ACP (advance care planning)     Venous stasis ulcers of both lower extremities (H)     Past Surgical History:   Procedure Laterality Date     BLADDER SURGERY  07/31/2017    bladder repair done at [place]     COLONOSCOPY  11-     ENT SURGERY      tonsillectomy     GYN SURGERY  1987    hysterectomy     GYN SURGERY  2014    right ovary removed at [place]     PHACOEMULSIFICATION WITH STANDARD INTRAOCULAR LENS IMPLANT Left 9/10/2015    Procedure: PHACOEMULSIFICATION WITH STANDARD INTRAOCULAR LENS IMPLANT;  Surgeon: Ray Lucero MD;  Location: [facility] OR     PHACOEMULSIFICATION WITH STANDARD INTRAOCULAR LENS IMPLANT Right 9/24/2015    Procedure: PHACOEMULSIFICATION WITH STANDARD INTRAOCULAR LENS IMPLANT;  Surgeon: Ray Lucero MD;  Location: [facility] OR       Social History   Substance Use Topics     Smoking status: Former Smoker     Packs/day: 2.00     Years: 15.00     Types: Cigarettes     Quit date: 6/14/1983     Smokeless tobacco: Never Used     Alcohol use No      "Family History   Problem Relation Age of Onset     HEART DISEASE Mother      C.A.D. Mother      HEART DISEASE Father      MI     Blood Disease Father      Breast Cancer Sister          Current Outpatient Prescriptions   Medication Sig Dispense Refill     Ascorbic Acid (VITAMIN C PO) Take 500 mg by mouth 2 times daily       aspirin 81 MG tablet Take 1 tablet by mouth daily.       Atorvastatin Calcium (LIPITOR PO) Take 10 mg by mouth daily       Calcium Carbonate Antacid (TUMS PO) Take by mouth as needed       ciprofloxacin (CIPRO) 500 MG tablet Take 1 tablet (500 mg) by mouth 2 times daily 14 tablet 0     Coenzyme Q10 (CO Q-10 PO) Take 1 tablet by mouth daily.       GARLIC PO Take 1 tablet by mouth daily       KRILL OIL OMEGA-3 PO Take by mouth 2 times daily       multivitamin, therapeutic with minerals (MULTI-VITAMIN) TABS tablet Take 1 tablet by mouth daily       nitroFURantoin, macrocrystal-monohydrate, (MACROBID) 100 MG capsule Take 100 mg by mouth daily       sodium chloride (OCEAN) 0.65 % nasal spray Spray 1 spray into both nostrils daily as needed for congestion       Triamcinolone Acetonide (NASACORT AQ NA) Spray 1 spray in nostril daily       UNABLE TO FIND Take by mouth 3 times daily as needed MEDICATION NAME: Bone maximizer 3       Allergies   Allergen Reactions     Codeine      constipation     Sulfa Drugs Other (See Comments)     Flush; sulfonamide antibiotics       ROS:  Constitutional, HEENT, cardiovascular, pulmonary, gi and gu systems are negative, except as otherwise noted.    OBJECTIVE:                                                    /70  Pulse 76  Temp 97.8  F (36.6  C) (Tympanic)  Ht 5' 7\" (1.702 m)  Wt 175 lb 12.8 oz (79.7 kg)  SpO2 97%  BMI 27.53 kg/m2  Body mass index is 27.53 kg/(m^2).  GENERAL APPEARANCE: Alert, no acute distress  CV: regular rate and rhythm, no murmur, rub or gallop  RESP: lungs clear to auscultation bilaterally  ABDOMEN: normal bowel sounds, soft, nontender, " no hepatosplenomegaly or other masses  SKIN: no suspicious lesions or rashes to visualized skin  NEURO: Alert, oriented x 3, speech and mentation normal           ASSESSMENT/PLAN:                                                    1. Dysuria  With positive UA.  Treat and follow.    - *UA reflex to Microscopic and Culture - [facility]  - Urine Microscopic    2. Abnormal findings on microbiological examination of urine  As above.   - Urine Culture Aerobic Bacterial    3. Acute cystitis without hematuria  As above.    - ciprofloxacin (CIPRO) 500 MG tablet; Take 1 tablet (500 mg) by mouth 2 times daily  Dispense: 14 tablet; Refill: 0    4. Overeating  Discussion today.  Offered dietary.  She's done it.  Offer psychology consult and she accepts.  Here in town is recommended.  She seemed please.  She was even a bit teary eyed when talking about this.    - PSYCHOLOGY REFERRAL          Paddy Ibrahim MD  [facility]

## 2018-07-10 ASSESSMENT — ANXIETY QUESTIONNAIRES: GAD7 TOTAL SCORE: 0

## 2018-07-10 ASSESSMENT — PATIENT HEALTH QUESTIONNAIRE - PHQ9: SUM OF ALL RESPONSES TO PHQ QUESTIONS 1-9: 0

## 2018-07-11 LAB
BACTERIA SPEC CULT: ABNORMAL
Lab: ABNORMAL
SPECIMEN SOURCE: ABNORMAL

## 2018-07-12 ENCOUNTER — TELEPHONE (OUTPATIENT)
Dept: FAMILY MEDICINE | Facility: OTHER | Age: 68
End: 2018-07-12

## 2018-07-12 NOTE — TELEPHONE ENCOUNTER
Pt had UA done and it was negative.  She is still having burning and would like to know what you think she has?  Please advise.

## 2018-07-12 NOTE — TELEPHONE ENCOUNTER
We should see her next week, and I should do vaginal exam.  With the contamination it could be more vaginal.  If I can't figure it out I would refer to urology.  Thanks.  Paddy Ibrahim

## 2019-07-01 ENCOUNTER — TELEPHONE (OUTPATIENT)
Dept: FAMILY MEDICINE | Facility: OTHER | Age: 69
End: 2019-07-01

## 2019-07-01 ENCOUNTER — OFFICE VISIT (OUTPATIENT)
Dept: FAMILY MEDICINE | Facility: OTHER | Age: 69
End: 2019-07-01
Attending: NURSE PRACTITIONER
Payer: COMMERCIAL

## 2019-07-01 VITALS
WEIGHT: 172 LBS | HEART RATE: 72 BPM | BODY MASS INDEX: 26.94 KG/M2 | SYSTOLIC BLOOD PRESSURE: 146 MMHG | OXYGEN SATURATION: 99 % | TEMPERATURE: 97.6 F | DIASTOLIC BLOOD PRESSURE: 82 MMHG

## 2019-07-01 DIAGNOSIS — M79.2 NERVE PAIN: Primary | ICD-10-CM

## 2019-07-01 DIAGNOSIS — F41.9 ANXIETY: ICD-10-CM

## 2019-07-01 PROCEDURE — G0463 HOSPITAL OUTPT CLINIC VISIT: HCPCS

## 2019-07-01 PROCEDURE — 99213 OFFICE O/P EST LOW 20 MIN: CPT | Performed by: NURSE PRACTITIONER

## 2019-07-01 RX ORDER — CARBOXYMETHYLCELLULOSE SODIUM 5 MG/ML
1 SOLUTION/ DROPS OPHTHALMIC 2 TIMES DAILY
COMMUNITY

## 2019-07-01 RX ORDER — GABAPENTIN 300 MG/1
300 CAPSULE ORAL DAILY PRN
Qty: 30 CAPSULE | Refills: 1 | Status: SHIPPED | OUTPATIENT
Start: 2019-07-01 | End: 2021-06-10

## 2019-07-01 RX ORDER — HYDROCORTISONE 2.5 %
2.5 CREAM (GRAM) TOPICAL DAILY
Refills: 1 | COMMUNITY
Start: 2019-05-21 | End: 2021-06-10

## 2019-07-01 RX ORDER — WHEAT DEXTRIN/ASPARTAME 3 G/6 G
1 POWDER IN PACKET (EA) ORAL 3 TIMES DAILY
Status: ON HOLD | COMMUNITY
End: 2024-05-27

## 2019-07-01 RX ORDER — ROSUVASTATIN CALCIUM 5 MG/1
5 TABLET, COATED ORAL AT BEDTIME
Status: ON HOLD | COMMUNITY
Start: 2019-05-08 | End: 2024-05-27

## 2019-07-01 RX ORDER — PYRIDOXINE HCL (VITAMIN B6) 100 MG
1 TABLET ORAL DAILY
Status: ON HOLD | COMMUNITY
End: 2024-05-27

## 2019-07-01 RX ORDER — FLUOCINONIDE 0.5 MG/G
1 CREAM TOPICAL
COMMUNITY
Start: 2019-05-08 | End: 2020-05-07

## 2019-07-01 RX ORDER — TRAMADOL HYDROCHLORIDE 50 MG/1
50 TABLET ORAL EVERY 6 HOURS PRN
COMMUNITY
Start: 2019-06-14 | End: 2019-07-26

## 2019-07-01 RX ORDER — LANOLIN ALCOHOL/MO/W.PET/CERES
1000 CREAM (GRAM) TOPICAL DAILY
Status: ON HOLD | COMMUNITY
End: 2024-05-27

## 2019-07-01 RX ORDER — GLUCOSAMINE HCL 500 MG
1 TABLET ORAL AT BEDTIME
Status: ON HOLD | COMMUNITY
Start: 2013-04-24 | End: 2024-05-27

## 2019-07-01 RX ORDER — CETIRIZINE HYDROCHLORIDE 10 MG/1
10 TABLET ORAL AT BEDTIME
Status: ON HOLD | COMMUNITY
End: 2024-05-27

## 2019-07-01 ASSESSMENT — PAIN SCALES - GENERAL: PAINLEVEL: MODERATE PAIN (5)

## 2019-07-01 NOTE — PATIENT INSTRUCTIONS
Patient Education   Patient Education     Gabapentin capsules or tablets  Brand Names: Active-PAC with Gabapentin, Neurontin  What is this medicine?  GABAPENTIN (GA ba pen tin) is used to control partial seizures in adults with epilepsy. It is also used to treat certain types of nerve pain.  How should I use this medicine?  Take this medicine by mouth with a glass of water. Follow the directions on the prescription label. You can take it with or without food. If it upsets your stomach, take it with food.Take your medicine at regular intervals. Do not take it more often than directed. Do not stop taking except on your doctor's advice.  If you are directed to break the 600 or 800 mg tablets in half as part of your dose, the extra half tablet should be used for the next dose. If you have not used the extra half tablet within 28 days, it should be thrown away.  A special MedGuide will be given to you by the pharmacist with each prescription and refill. Be sure to read this information carefully each time.  Talk to your pediatrician regarding the use of this medicine in children. Special care may be needed.  What side effects may I notice from receiving this medicine?  Side effects that you should report to your doctor or health care professional as soon as possible:    allergic reactions like skin rash, itching or hives, swelling of the face, lips, or tongue    worsening of mood, thoughts or actions of suicide or dying  Side effects that usually do not require medical attention (report to your doctor or health care professional if they continue or are bothersome):    constipation    difficulty walking or controlling muscle movements    dizziness    nausea    slurred speech    tiredness    tremors    weight gain  What may interact with this medicine?  Do not take this medicine with any of the following medications:    other gabapentin products  This medicine may also interact with the following  medications:    alcohol    antacids    antihistamines for allergy, cough and cold    certain medicines for anxiety or sleep    certain medicines for depression or psychotic disturbances    homatropine; hydrocodone    naproxen    narcotic medicines (opiates) for pain    phenothiazines like chlorpromazine, mesoridazine, prochlorperazine, thioridazine  What if I miss a dose?  If you miss a dose, take it as soon as you can. If it is almost time for your next dose, take only that dose. Do not take double or extra doses.  Where should I keep my medicine?  Keep out of reach of children.  This medicine may cause accidental overdose and death if it taken by other adults, children, or pets. Mix any unused medicine with a substance like cat litter or coffee grounds. Then throw the medicine away in a sealed container like a sealed bag or a coffee can with a lid. Do not use the medicine after the expiration date.  Store at room temperature between 15 and 30 degrees C (59 and 86 degrees F).  What should I tell my health care provider before I take this medicine?  They need to know if you have any of these conditions:    kidney disease    suicidal thoughts, plans, or attempt; a previous suicide attempt by you or a family member    an unusual or allergic reaction to gabapentin, other medicines, foods, dyes, or preservatives    pregnant or trying to get pregnant    breast-feeding  What should I watch for while using this medicine?  Visit your doctor or health care professional for regular checks on your progress. You may want to keep a record at home of how you feel your condition is responding to treatment. You may want to share this information with your doctor or health care professional at each visit. You should contact your doctor or health care professional if your seizures get worse or if you have any new types of seizures. Do not stop taking this medicine or any of your seizure medicines unless instructed by your doctor or  health care professional. Stopping your medicine suddenly can increase your seizures or their severity.  Wear a medical identification bracelet or chain if you are taking this medicine for seizures, and carry a card that lists all your medications.  You may get drowsy, dizzy, or have blurred vision. Do not drive, use machinery, or do anything that needs mental alertness until you know how this medicine affects you. To reduce dizzy or fainting spells, do not sit or stand up quickly, especially if you are an older patient. Alcohol can increase drowsiness and dizziness. Avoid alcoholic drinks.  Your mouth may get dry. Chewing sugarless gum or sucking hard candy, and drinking plenty of water will help.  The use of this medicine may increase the chance of suicidal thoughts or actions. Pay special attention to how you are responding while on this medicine. Any worsening of mood, or thoughts of suicide or dying should be reported to your health care professional right away.  Women who become pregnant while using this medicine may enroll in the [place] Antiepileptic Drug Pregnancy Registry by calling 1-198.585.6337. This registry collects information about the safety of antiepileptic drug use during pregnancy.  NOTE:This sheet is a summary. It may not cover all possible information. If you have questions about this medicine, talk to your doctor, pharmacist, or health care provider. Copyright  2018 [employer]           Managing Chronic Pain   Being in pain can be exhausting. You may find you have trouble working, sleeping, or just doing day-to-day tasks. But you can learn to manage pain, feel better, and regain control of your life.   Understanding chronic pain  Chronic pain is a serious medical problem. It is defined as pain that lasts longer than 3 months. Chronic pain includes pain that you feel regularly, even if it comes and goes. The pain may be from an ongoing injury or health problem. Or it may be because of a  chronic pain syndrome, such as fibromyalgia. Sometimes pain persists when no cause can be found.   Pain should be treated  You have a right to have your pain treated. Untreated chronic pain can affect your overall health. It can lead to depression, anxiety, anger, and personality changes. It can also disrupt work, sleep, relationships, and other aspects of normal life. It may not be possible to relieve all of your pain. But it can be reduced to a level you can cope with.   Your role in treatment  Your healthcare provider will work closely with you on a plan to manage your pain. But it s up to you to put this plan into action. Control of chronic pain is done mainly through self-management. This means that you take an active role in your care. Getting support from family and friends is important too.   Planning your treatment  Your healthcare provider will first look for a cause of your pain that can be treated. He or she will also assess your pain level. This may be done by asking you to rate your pain on a scale from 1 (low pain) to 10 (severe pain). Your provider will also ask you to describe the pain. For example, is your pain sharp or dull? Is it constant or does it come and go? You may be asked to keep a pain log. This is a diary in which you track your pain. It may help identify things that tend to make your pain worse. You and your healthcare provider can make a plan to help prevent and cope with pain on a daily basis. In some cases, you may be referred to a special pain program or clinic. Your treatment plan may include:    Medicines    Complementary therapies    Mind and body therapies    Other medical treatments    Getting physical activity   Medicines  Medicine will most likely be a part of your treatment plan. Your provider will evaluate which are the best medicines for your pain. You may use over-the-counter or prescription medicines. You may need to take more than one medicine. It may take some time to  find the best medicine or combination of medicines for your pain. Take all medicines as directed. Pain medicines can be used in many ways. You may take medicines:    Every day to help   stay ahead  of the pain so that it doesn t flare up    At times when pain is worse than usual    Before activities that tend to trigger pain    To decrease sensitivity to pain   Medicines for chronic pain include:    Nonsteroidal anti-inflammatory medicines (NSAIDs) for pain from swelling and inflammation. Your provider may prescribe a type of NSAID called a BASS inhibitor.    Acetaminophen    Anticonvulsants to treat nerve pain called neuropathy    Antidepressants to treat neuropathy    Muscle relaxants for muscle spasms    Topical medicines. These are put on the skin.    Opioids, or narcotics, to treat severe pain. These very strong medicines can help ease certain kinds of pain. They most often are used for short periods of time. Your provider will monitor your care very closely if you take opioids.   Complementary therapies  These are treatments that can be used along with medical care to help relieve pain. Look for a licensed practitioner with experience treating chronic pain. Talk with your healthcare provider about using complementary therapies such as:    Massage    Acupuncture and acupressure    Chiropractic    Vitamins or herbal supplements   Mind/body therapies  The brain and the body are both part of the pain response. The brain reads the pain signals from the body. This means that your mind has some control over how pain signals are processed. Mind/body therapies may help change how your brain reads pain signals. They may be learned with the help of a trained therapist or in a class. They include:    Deep breathing    Distraction    Visualization    Meditation    Biofeedback   Other medical treatments  If other treatments don t work for you, one of these procedures or devices may help:    Nerve blocks to numb nerves in a  painful area    Trigger point injections for painful muscles    Steroid injections for joint pain     Transcutaneous electrical nerve stimulation (TENS) to block pain signals to the brain    Spinal stimulation to block spinal pain    Implanted spinal pump that contains pain medicine    Ablation using heat, cold, or chemicals to destroy painful nerves                                                                                                                    Getting physical activity  Being physically active has many benefits. It can improve your ability to cope with pain. It may also help improve your mood, sleep, and overall health. Your healthcare provider can help you plan an exercise program that s right for your needs. This may include:    Stretching and range-of-motion exercises    Low-impact exercise such as walking, biking, swimming, and other water exercise    Strength training using light weights    Walking up the stairs instead of taking the elevator    Riding a bike instead of driving    Parking your car farther from your destination   You may need to not do high-impact activities. These involve jumping, running, or sudden starts, stops, or changes of direction. If you haven t exercised in a long time or you have physical limitations, your healthcare provider may refer you to a physical therapist. He or she can teach you stretches and exercises that fit your condition and fitness level.   Being active and healthy  A healthier lifestyle makes it easier to cope with pain and function better. Follow these tips:    Choose a balance of healthy foods and drinks.    Limit alcohol and caffeine.    Go to bed at about the same time each day.    Don t let pain keep you from others. Spend time with friends and family.    Keep your mind active. Read books or take classes.    If you re not working, volunteer or join a club or social group.   Getting support  A support group lets you talk with others who also  have chronic pain. Chronic pain support groups can help you feel less isolated. They can also give you tips for coping with pain. To find a local support group, contact your nearest hospital or pain clinic.   You may also want to try counseling. Counseling can help you learn coping skills and methods such as visualization. It can also help with mood problems. When choosing a counselor, look for someone who has worked with people who have chronic pain.   See your provider for regular visits and let him or her know how well treatments are working for you. Also reach out to family and friends for help and support.                              For more support and information, contact these groups:    American Academy of Pain Management, www.aapainmanage.org    American Academy of Pain Medicine, www.painmed.org    American Chronic Pain Association, www.theacpa.org    National Pain Foundation, www.thenationalpainfoundation.org  Date Last Reviewed: 12/1/2017 2000-2018 [employer]. [address] [place] [address], [place] [address]. All rights reserved. This information is not intended as a substitute for professional medical care. Always follow your healthcare professional's instructions.

## 2019-07-01 NOTE — NURSING NOTE
"Chief Complaint   Patient presents with     Back Pain       Initial /82   Pulse 72   Temp 97.6  F (36.4  C) (Tympanic)   Wt 78 kg (172 lb)   SpO2 99%   BMI 26.94 kg/m   Estimated body mass index is 26.94 kg/m  as calculated from the following:    Height as of 7/9/18: 1.702 m (5' 7\").    Weight as of this encounter: 78 kg (172 lb).  Medication Reconciliation: complete     Lacey Bowen      "

## 2019-07-01 NOTE — PROGRESS NOTES
Subjective     Sammie Heaton is a 69 year old female who presents to clinic today for the following health issues:    HPI   Musculoskeletal problem/pain      Duration: 6-13-19 since having her mastectomy done    Description  Location: upper back on right side.    Intensity:  moderate, severe    Accompanying signs and symptoms: none    History  Previous similar problem: no   Previous evaluation:  none    Precipitating or alleviating factors:  Trauma or overuse: YES- had some drains put in for surgery and the pain has been there ever since.  Aggravating factors include: unsure, but notices it in the morning and when she gets up in the middle of the night.     Therapies tried and outcome: nothing      Patient Active Problem List   Diagnosis     ACP (advance care planning)     Venous stasis ulcers of both lower extremities (H)     Past Surgical History:   Procedure Laterality Date     BLADDER SURGERY  2017    bladder repair done at [facility]     COLONOSCOPY  2005     ENT SURGERY      tonsillectomy     GYN SURGERY      hysterectomy     GYN SURGERY      right ovary removed at [facility]     PHACOEMULSIFICATION WITH STANDARD INTRAOCULAR LENS IMPLANT Left 9/10/2015    Procedure: PHACOEMULSIFICATION WITH STANDARD INTRAOCULAR LENS IMPLANT;  Surgeon: Ray Lucero MD;  Location: [facility] OR     PHACOEMULSIFICATION WITH STANDARD INTRAOCULAR LENS IMPLANT Right 2015    Procedure: PHACOEMULSIFICATION WITH STANDARD INTRAOCULAR LENS IMPLANT;  Surgeon: Ray Lucero MD;  Location: [facility] OR       Social History     Tobacco Use     Smoking status: Former Smoker     Packs/day: 2.00     Years: 15.00     Pack years: 30.00     Types: Cigarettes     Start date: 1966     Last attempt to quit: 1983     Years since quittin.0     Smokeless tobacco: Never Used   Substance Use Topics     Alcohol use: No     Family History   Problem Relation Age of Onset     Heart Disease Mother      C.A.D. Mother      Heart Disease  Father         MI     Blood Disease Father      Breast Cancer Sister          Current Outpatient Medications   Medication Sig Dispense Refill     ACIDOPHILUS LACTOBACILLUS PO Take 1 capsule by mouth daily       amitriptyline (ELAVIL) 25 MG tablet Take 25 mg by mouth At Bedtime  11     APPLE CIDER VINEGAR PO Take 1 tablet by mouth daily       Ascorbic Acid (VITAMIN C PO) Take 500 mg by mouth 2 times daily       aspirin 81 MG tablet Take 1 tablet by mouth daily.       Atorvastatin Calcium (LIPITOR PO) Take 10 mg by mouth daily       bulk laxative (BENEFIBER DRINK MIX) packet Take 1 packet by mouth daily       Calcium Carbonate Antacid (TUMS PO) Take by mouth as needed       carboxymethylcellulose (CARBOXYMETHYLCELLULOSE SODIUM) 0.5 % SOLN ophthalmic solution Apply 1 drop to eye 2 times daily       cetirizine (ZYRTEC) 10 MG tablet Take 10 mg by mouth At Bedtime       coenzyme Q-10 100 MG TABS Take 1 tablet by mouth At Bedtime       Coenzyme Q10 (CO Q-10 PO) Take 1 tablet by mouth daily.       Cranberry (RA CRANBERRY) 500 MG CAPS Take 1 capsule by mouth daily       cyanocobalamin (VITAMIN B-12) 1000 MCG tablet Take 1,000 mcg by mouth daily       diclofenac (VOLTAREN) 1 % topical gel Apply 2 g topically as needed       fluocinonide (LIDEX) 0.05 % external cream Apply 1 Application topically every 14 days       gabapentin (NEURONTIN) 300 MG capsule Take 1 capsule (300 mg) by mouth daily as needed (mastectomy pain and anxiety) 30 capsule 1     GARLIC PO Take 1 tablet by mouth daily       hydrocortisone 2.5 % cream Apply 2.5 mg% topically daily  1     KRILL OIL OMEGA-3 PO Take by mouth 2 times daily       Multiple Vitamins-Minerals (ONE-A-DAY PROACTIVE 65+ PO) Take 2 tablets by mouth       multivitamin, therapeutic with minerals (MULTI-VITAMIN) TABS tablet Take 1 tablet by mouth daily       rosuvastatin (CRESTOR) 5 MG tablet Take 5 mg by mouth At Bedtime       sodium chloride (OCEAN) 0.65 % nasal spray Spray 1 spray into  both nostrils daily as needed for congestion       traMADol (ULTRAM) 50 MG tablet Take 50 mg by mouth every 6 hours as needed       Turmeric Curcumin 500 MG CAPS Take 1 capsule by mouth daily       UNABLE TO FIND Take 1 each by mouth daily Hair, skin and nails       UNABLE TO FIND Spray 1 spray in nostril daily Nohemi-geranium in sesame oil nasal spray       UNABLE TO FIND Take by mouth 3 times daily as needed MEDICATION NAME: Bone maximizer 3       Allergies   Allergen Reactions     Codeine      constipation     Sulfa Drugs Other (See Comments)     Flush; sulfonamide antibiotics       Reviewed and updated as needed this visit by Provider         Review of Systems   ROS COMP: CONSTITUTIONAL: NEGATIVE for fever, chills, change in weight  INTEGUMENTARY/SKIN: healing area bilateral breast and drain sites  RESP: NEGATIVE for significant cough or SOB  BREAST: bilateral mastectomy  CV: NEGATIVE for chest pain, palpitations or peripheral edema  MUSCULOSKELETAL: tender to touch lateral bilateral breast and above drain site   PSYCHIATRIC: increased anxiety and depression      Objective    /82   Pulse 72   Temp 97.6  F (36.4  C) (Tympanic)   Wt 78 kg (172 lb)   SpO2 99%   BMI 26.94 kg/m    Body mass index is 26.94 kg/m .  Physical Exam   GENERAL: alert and no distress  RESP: lungs clear to auscultation - no rales, rhonchi or wheezes  CV: regular rate and rhythm, normal S1 S2, no S3 or S4, no murmur, click or rub, no peripheral edema and peripheral pulses strong  MS: tenderness to palpation lateral side right and left above puncture site from drains and lateral surgical site from bilateral mastectomy  SKIN: healing surgical site, no signs of infection  PSYCH: mentation appears normal and tearful    Diagnostic Test Results:  Labs reviewed in Epic  Results for orders placed or performed in visit on 07/09/18   *[facility] Microscopic and [facility]   Result Value Ref Range    Color Urine Yellow      Appearance Urine Cloudy     Glucose Urine Negative NEG^Negative mg/dL    Bilirubin Urine Negative NEG^Negative    Ketones Urine Negative NEG^Negative mg/dL    Specific Gravity Urine 1.015 1.003 - 1.035    Blood Urine Negative NEG^Negative    pH Urine 7.5 (H) 5.0 - 7.0 pH    Protein Albumin Urine Negative NEG^Negative mg/dL    Urobilinogen Urine 0.2 0.2 - 1.0 EU/dL    Nitrite Urine Negative NEG^Negative    Leukocyte Esterase Urine Large (A) NEG^Negative    Source Midstream Urine    Urine Microscopic   Result Value Ref Range    WBC Urine 10-25 (A) OTO5^0 - 5 /HPF    RBC Urine NONE SEEN OTO2^O - 2 /HPF    Squamous Epithelial /LPF Urine Few FEW^Few /LPF    Bacteria Urine Few (A) NEG^Negative /HPF   Urine Culture Aerobic Bacterial   Result Value Ref Range    Specimen Description Midstream Urine     Special Requests PREPLATED     Culture Micro (A)      50,000 to 100,000 colonies/mL  Mixed bacterial jw  NFIOS             Assessment & Plan     1. Nerve pain  Possible nerve pain from surgery. She has used gabapentin in the past for RLS and it worked well. Plan to start once daily or as needed for pain and anxiety.  She may need an increase if not having adequate relief.   - gabapentin (NEURONTIN) 300 MG capsule; Take 1 capsule (300 mg) by mouth daily as needed (mastectomy pain and anxiety)  Dispense: 30 capsule; Refill: 1    2. Anxiety  As above.  She is encouraged to go to support group or may need counseling in the near future. She states she has a good support system with her family, but she if anxious and very tearful/emotional.   - gabapentin (NEURONTIN) 300 MG capsule; Take 1 capsule (300 mg) by mouth daily as needed (mastectomy pain and anxiety)  Dispense: 30 capsule; Refill: 1     Discussed at length to follow up if no improvement or not enough improvement with 1 gabapentin daily as needed. She is encouraged to take on a regular basis until pain decreases.     See Patient Instructions    No follow-ups on  file.    Shraddha Newton, SAMANTHA [facility]

## 2019-07-01 NOTE — TELEPHONE ENCOUNTER
Situation- Pt called, reports burning sensation in her back.     Background- Pt reports that she recently had a bilateral mastectomy on 6-13 at [facility].    Assessment- Pt reports that burning sensation has been present since she had surgery. Reports that she spoke with her care team at [facility] and they advised that she be seen by PCP. Pt also states that staff at [facility] thought the burning sensation was due to the drains they placed after surgery however pt had drains removed 11 days ago and is still experiencing burning sensation. Pt sounds very anxious and seems to be looking for reassurance.     Recommendation- Spoke with PCP's nurse regarding pt. Scheduled pt with covering provider today.     Will comply with recommendation: Yes    If further questions/concerns or if symptoms do not improve, worsen or new symptoms develop, call your PCP or [facility] as soon as possible.      Guideline used: Adult Telephone Protocols Office Version 3rd Edition - Mingo Ibrahim MD, KHADRA Harris RN

## 2019-07-26 ENCOUNTER — HOSPITAL ENCOUNTER (EMERGENCY)
Facility: HOSPITAL | Age: 69
Discharge: HOME | End: 2019-07-26
Attending: PHYSICIAN ASSISTANT | Admitting: PHYSICIAN ASSISTANT
Payer: MEDICARE

## 2019-07-26 VITALS
HEART RATE: 79 BPM | SYSTOLIC BLOOD PRESSURE: 161 MMHG | DIASTOLIC BLOOD PRESSURE: 81 MMHG | OXYGEN SATURATION: 97 % | RESPIRATION RATE: 16 BRPM | TEMPERATURE: 98.4 F

## 2019-07-26 DIAGNOSIS — J06.9 UPPER RESPIRATORY TRACT INFECTION, UNSPECIFIED TYPE: ICD-10-CM

## 2019-07-26 DIAGNOSIS — J06.9 URI (UPPER RESPIRATORY INFECTION): ICD-10-CM

## 2019-07-26 PROCEDURE — 99213 OFFICE O/P EST LOW 20 MIN: CPT | Mod: Z6 | Performed by: PHYSICIAN ASSISTANT

## 2019-07-26 PROCEDURE — G0463 HOSPITAL OUTPT CLINIC VISIT: HCPCS

## 2019-07-26 ASSESSMENT — ENCOUNTER SYMPTOMS
TROUBLE SWALLOWING: 0
FATIGUE: 1
RHINORRHEA: 0
VOICE CHANGE: 1
VOMITING: 0
NAUSEA: 0
CHILLS: 0
DIARRHEA: 0
SINUS PAIN: 0
FEVER: 0
COUGH: 0
SORE THROAT: 0
SHORTNESS OF BREATH: 0

## 2019-07-26 NOTE — ED PROVIDER NOTES
History     Chief Complaint   Patient presents with     Sinusitis     for 1 week     Laryngitis     since yesterday     HPI  Sammie Heaton is a 69 year old female who presents to urgent care for evaluation of cold symptoms.  Patient states that over the past 3 days she has had congestion, headache, bilateral otalgia, fatigue, laryngitis.  Patient denies any cough, shortness of breath, fevers, nausea, vomiting, diarrhea, or any other associated symptoms.    Allergies:  Allergies   Allergen Reactions     Codeine      constipation     Sulfa Drugs Other (See Comments)     Flush; sulfonamide antibiotics       Problem List:    Patient Active Problem List    Diagnosis Date Noted     Venous stasis ulcers of both lower extremities (H) 06/03/2016     Priority: Medium     ACP (advance care planning) 05/25/2016     Priority: Medium     Advance Care Planning 5/25/2016: ACP Review of Chart / Resources Provided:  Reviewed chart for advance care plan.  Sammie Heaton has no plan or code status on file however states presence of ACP document. Copy requested. Confirmed code status reflects current choices pending receipt of document/advance care plan review.  Confirmed/documented legally designated decision makers.  Added by Cleo Yusuf      Advance Care Planning 6/9/2017: ACP Review of Chart / Resources Provided:  Reviewed chart for advance care plan.  Sammie Heaton has no plan or code status on file however states presence of ACP document. Copy requested.   Added by Luz Ford                Past Medical History:    Past Medical History:   Diagnosis Date     Abdominal pain, unspecified site 09/19/2001     Cystitis, unspecified 05/12/2003     Dermatophytosis of the body 11/07/2006     Follow-up examination, following unspecified surgery 03/26/2003     Hemorrhage of rectum and anus 10/20/2005     Nonspecific abnormal findings on radiological and 06/27/2000     Osteoporosis, unspecified 07/26/2000     Other abnormal  findings on radiological examinatio 2002     Other screening mammogram 2001     Routine general medical examination at a health care facility 2000     Sebaceous cyst 03/10/2003     Unspecified ulcer of lower limb 2003     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) 2003     Venous (peripheral) insufficiency, unspecified 2004       Past Surgical History:    Past Surgical History:   Procedure Laterality Date     BLADDER SURGERY  2017    bladder repair done at [facility]     COLONOSCOPY  2005     ENT SURGERY      tonsillectomy     GYN SURGERY      hysterectomy     GYN SURGERY      right ovary removed at [facility]     PHACOEMULSIFICATION WITH STANDARD INTRAOCULAR LENS IMPLANT Left 9/10/2015    Procedure: PHACOEMULSIFICATION WITH STANDARD INTRAOCULAR LENS IMPLANT;  Surgeon: Ray Lucero MD;  Location: [facility]     PHACOEMULSIFICATION WITH STANDARD INTRAOCULAR LENS IMPLANT Right 2015    Procedure: PHACOEMULSIFICATION WITH STANDARD INTRAOCULAR LENS IMPLANT;  Surgeon: Ray Lucero MD;  Location: [facility] OR       Family History:    Family History   Problem Relation Age of Onset     Heart Disease Mother      C.A.D. Mother      Heart Disease Father         MI     Blood Disease Father      Breast Cancer Sister        Social History:  Marital Status:   [2]  Social History     Tobacco Use     Smoking status: Former Smoker     Packs/day: 2.00     Years: 15.00     Pack years: 30.00     Types: Cigarettes     Start date: 1966     Last attempt to quit: 1983     Years since quittin.1     Smokeless tobacco: Never Used   Substance Use Topics     Alcohol use: No     Drug use: No        Medications:      ACIDOPHILUS LACTOBACILLUS PO   APPLE CIDER VINEGAR PO   Ascorbic Acid (VITAMIN C PO)   aspirin 81 MG tablet   cetirizine (ZYRTEC) 10 MG tablet   coenzyme Q-10 100 MG TABS   Cranberry (RA CRANBERRY) 500 MG CAPS   cyanocobalamin (VITAMIN B-12) 1000 MCG tablet    gabapentin (NEURONTIN) 300 MG capsule   GARLIC PO   KRILL OIL OMEGA-3 PO   Multiple Vitamins-Minerals (ONE-A-DAY PROACTIVE 65+ PO)   rosuvastatin (CRESTOR) 5 MG tablet   Turmeric Curcumin 500 MG CAPS   UNABLE TO FIND   UNABLE TO FIND   UNABLE TO FIND   amitriptyline (ELAVIL) 25 MG tablet   bulk laxative (BENEFIBER DRINK MIX) packet   Calcium Carbonate Antacid (TUMS PO)   carboxymethylcellulose (CARBOXYMETHYLCELLULOSE SODIUM) 0.5 % SOLN ophthalmic solution   diclofenac (VOLTAREN) 1 % topical gel   fluocinonide (LIDEX) 0.05 % external cream   hydrocortisone 2.5 % cream         Review of Systems   Constitutional: Positive for fatigue. Negative for chills and fever.   HENT: Positive for congestion, ear pain and voice change. Negative for postnasal drip, rhinorrhea, sinus pain, sore throat and trouble swallowing.    Respiratory: Negative for cough and shortness of breath.    Gastrointestinal: Negative for diarrhea, nausea and vomiting.   Skin: Negative for rash.       Physical Exam   BP: 161/81  Pulse: 79  Temp: 98.4  F (36.9  C)  Resp: 16  SpO2: 97 %      Physical Exam   Constitutional: She is oriented to person, place, and time. She appears well-developed. No distress.   HENT:   Head: Normocephalic.   Right Ear: Tympanic membrane normal.   Left Ear: Tympanic membrane normal.   Mouth/Throat: Uvula is midline, oropharynx is clear and moist and mucous membranes are normal.   Eyes: Pupils are equal, round, and reactive to light.   Cardiovascular: Normal rate, regular rhythm and normal heart sounds.   Pulmonary/Chest: Effort normal and breath sounds normal. No stridor. No respiratory distress. She has no wheezes. She has no rales. She exhibits no tenderness.   Neurological: She is alert and oriented to person, place, and time.   Nursing note and vitals reviewed.      ED Course        Procedures              Critical Care time:               No results found for this or any previous visit (from the past 24  hour(s)).    Medications - No data to display    Assessments & Plan (with Medical Decision Making)   #1.  URI    Discussed exam findings with patient.  Supportive cares were discussed at length.  Push fluids and rest.  Any further concerns follow-up with primary care provider.  Any emergent concerns return to ED.  Patient verbalizes understanding and agreement of plan.        I have reviewed the nursing notes.    I have reviewed the findings, diagnosis, plan and need for follow up with the patient.         Medication List      There are no discharge medications for this visit.         Final diagnoses:   URI (upper respiratory infection)       7/26/2019   [facility]     Shemar Barillas PA-C  07/26/19 1047

## 2019-07-26 NOTE — ED AVS SNAPSHOT
[facility]  [address] [address]  Phone:  [phone]                                    Sammie Heaton   MRN: [id number]    Department:  [facility]   Date of Visit:  7/26/2019           After Visit Summary Signature Page    I have received my discharge instructions, and my questions have been answered. I have discussed any challenges I see with this plan with the nurse or doctor.    ..........................................................................................................................................  Patient/Patient Representative Signature      ..........................................................................................................................................  Patient Representative Print Name and Relationship to Patient    ..................................................               ................................................  Date                                   Time    ..........................................................................................................................................  Reviewed by Signature/Title    ...................................................              ..............................................  Date                                               Time          22EPIC Rev 08/18

## 2019-07-26 NOTE — ED TRIAGE NOTES
Pt presents today with  for c/o laryngitis and sinusitis, sinuses started on Tuesday and laryngitis started yesterday.

## 2019-07-26 NOTE — DISCHARGE INSTRUCTIONS
Supportive cares as discussed  Any further concern follow-up with primary care provider  Any emergent concerns return to ED

## 2019-10-17 ENCOUNTER — HOSPITAL ENCOUNTER (EMERGENCY)
Facility: CLINIC | Age: 69
Discharge: HOME | End: 2019-10-17
Attending: FAMILY MEDICINE | Admitting: FAMILY MEDICINE
Payer: MEDICARE

## 2019-10-17 ENCOUNTER — APPOINTMENT (OUTPATIENT)
Dept: GENERAL RADIOLOGY | Facility: CLINIC | Age: 69
End: 2019-10-17
Attending: FAMILY MEDICINE
Payer: MEDICARE

## 2019-10-17 VITALS
DIASTOLIC BLOOD PRESSURE: 72 MMHG | TEMPERATURE: 97.4 F | OXYGEN SATURATION: 98 % | HEIGHT: 67 IN | BODY MASS INDEX: 26.68 KG/M2 | SYSTOLIC BLOOD PRESSURE: 159 MMHG | RESPIRATION RATE: 16 BRPM | WEIGHT: 170 LBS

## 2019-10-17 DIAGNOSIS — L97.321 VENOUS STASIS ULCER OF LEFT ANKLE LIMITED TO BREAKDOWN OF SKIN WITHOUT VARICOSE VEINS (H): ICD-10-CM

## 2019-10-17 DIAGNOSIS — I87.2 VENOUS STASIS ULCER OF LEFT ANKLE LIMITED TO BREAKDOWN OF SKIN WITHOUT VARICOSE VEINS (H): ICD-10-CM

## 2019-10-17 PROCEDURE — 73610 X-RAY EXAM OF ANKLE: CPT | Mod: LT

## 2019-10-17 PROCEDURE — 99283 EMERGENCY DEPT VISIT LOW MDM: CPT | Performed by: FAMILY MEDICINE

## 2019-10-17 PROCEDURE — 99284 EMERGENCY DEPT VISIT MOD MDM: CPT | Mod: Z6 | Performed by: FAMILY MEDICINE

## 2019-10-17 RX ORDER — DOXYCYCLINE 100 MG/1
100 CAPSULE ORAL 2 TIMES DAILY
Qty: 14 CAPSULE | Refills: 0 | Status: SHIPPED | OUTPATIENT
Start: 2019-10-17 | End: 2019-10-24

## 2019-10-17 RX ORDER — OXYCODONE HYDROCHLORIDE 5 MG/1
5 TABLET ORAL EVERY 6 HOURS PRN
Qty: 4 TABLET | Refills: 0 | Status: SHIPPED | OUTPATIENT
Start: 2019-10-17 | End: 2021-06-10

## 2019-10-17 ASSESSMENT — MIFFLIN-ST. JEOR: SCORE: 1333.74

## 2019-10-17 ASSESSMENT — ENCOUNTER SYMPTOMS
CONSTIPATION: 0
FEVER: 0
NAUSEA: 0
DIZZINESS: 0
RHINORRHEA: 0
WHEEZING: 0
SORE THROAT: 0
CHILLS: 0
HEADACHES: 0
COUGH: 0
WOUND: 1
MYALGIAS: 1
SHORTNESS OF BREATH: 0
FLANK PAIN: 0
FREQUENCY: 0
DIFFICULTY URINATING: 0
HEMATURIA: 0
ABDOMINAL PAIN: 0
TROUBLE SWALLOWING: 0
LIGHT-HEADEDNESS: 0
DIARRHEA: 0
VOMITING: 0
ARTHRALGIAS: 0
BLOOD IN STOOL: 0

## 2019-10-17 NOTE — DISCHARGE INSTRUCTIONS
ICD-10-CM    1. Venous stasis ulcer of left ankle limited to breakdown of skin without varicose veins (H) I87.2     L97.321     wound woith increased pain - localized rednbess - could be infected.  history of MRSA,  take doxycyclien for 7 days.  take home ibuprolfen 600 mg every 6 hours scheduled,. take tylenol 1000 mg every 6 hours scheduled.  return immed for worsening, fever.  Take oxycodone for breakthrough pain.  consider local lidocaine 4% patch on for 12 of every 24 horus.  follow-up in [place] for recheck.  consider mri to eval for infected bone - likely osteopenia changes on xray

## 2019-10-17 NOTE — ED AVS SNAPSHOT
[facility] Emergency Department  [address] [facility] [address]  Phone:  [phone]  Fax:  [phone]                                    Sammie Heaton   MRN: [id number]    Department:  [facility] Emergency Department   Date of Visit:  10/17/2019           After Visit Summary Signature Page    I have received my discharge instructions, and my questions have been answered. I have discussed any challenges I see with this plan with the nurse or doctor.    ..........................................................................................................................................  Patient/Patient Representative Signature      ..........................................................................................................................................  Patient Representative Print Name and Relationship to Patient    ..................................................               ................................................  Date                                   Time    ..........................................................................................................................................  Reviewed by Signature/Title    ...................................................              ..............................................  Date                                               Time          22EPIC Rev 08/18

## 2019-10-17 NOTE — ED PROVIDER NOTES
"  History     Chief Complaint   Patient presents with     Wound Infection     16 yrs/ states infected and painful     HPI  Sammie Heaton is a 69 year old female who presents to the emergency department for evaluation of a chronic wound. The patient states that she has had a chronic wound on her posterior left ankle for the past 16 years and that it periodically opens up. About one month ago, the wound opened more than it has in the past several years and became red. It then began draining about one week ago. She reports that the wound has closed a little since that time, but that it is still \"the worst it has every been\". She adds that she has not been diagnosed though she follows up with dermatology at the [facility] every year. The patient denies any recent fever, streaking redness up her left leg, groin pain or swelling. She also denies any past history of diabetes mellitus. She has had no recent injury or trauma to the area. The patient states that she used to experience pain relief with ibuprofen and Tylenol but reports that they no longer help. She does not remember when she was last on a course of antibiotics for her wound. She adds that she has had prolonged travel in an RV recently but that she attempts to keep her leg elevated on the dashboard. She will be driving to [place] in her RV next week.      Allergies:  Allergies   Allergen Reactions     Codeine      constipation     Sulfa Drugs Other (See Comments)     Flush; sulfonamide antibiotics       Problem List:    Patient Active Problem List    Diagnosis Date Noted     Venous stasis ulcers of both lower extremities (H) 06/03/2016     Priority: Medium     ACP (advance care planning) 05/25/2016     Priority: Medium     Advance Care Planning 5/25/2016: ACP Review of Chart / Resources Provided:  Reviewed chart for advance care plan.  Sammie Heaton has no plan or code status on file however states presence of ACP document. Copy requested. Confirmed code " status reflects current choices pending receipt of document/advance care plan review.  Confirmed/documented legally designated decision makers.  Added by Cleo Yusuf      Advance Care Planning 6/9/2017: ACP Review of Chart / Resources Provided:  Reviewed chart for advance care plan.  Sammie Heaton has no plan or code status on file however states presence of ACP document. Copy requested.   Added by Luz Ford                Past Medical History:    Past Medical History:   Diagnosis Date     Abdominal pain, unspecified site 09/19/2001     Cystitis, unspecified 05/12/2003     Dermatophytosis of the body 11/07/2006     Follow-up examination, following unspecified surgery 03/26/2003     Hemorrhage of rectum and anus 10/20/2005     Nonspecific abnormal findings on radiological and 06/27/2000     Osteoporosis, unspecified 07/26/2000     Other abnormal findings on radiological examinatio 03/18/2002     Other screening mammogram 09/11/2001     Routine general medical examination at a health care facility 06/14/2000     Sebaceous cyst 03/10/2003     Unspecified ulcer of lower limb 11/06/2003     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) 12/03/2003     Venous (peripheral) insufficiency, unspecified 05/27/2004       Past Surgical History:    Past Surgical History:   Procedure Laterality Date     BLADDER SURGERY  07/31/2017    bladder repair done at [facility]     COLONOSCOPY  11-     ENT SURGERY      tonsillectomy     GYN SURGERY  1987    hysterectomy     GYN SURGERY  2014    right ovary removed at [facility]     PHACOEMULSIFICATION WITH STANDARD INTRAOCULAR LENS IMPLANT Left 9/10/2015    Procedure: PHACOEMULSIFICATION WITH STANDARD INTRAOCULAR LENS IMPLANT;  Surgeon: Ray Lucero MD;  Location: [facility]     PHACOEMULSIFICATION WITH STANDARD INTRAOCULAR LENS IMPLANT Right 9/24/2015    Procedure: PHACOEMULSIFICATION WITH STANDARD INTRAOCULAR LENS IMPLANT;  Surgeon: Ray Lucero MD;  Location: [facility] OR        Family History:    Family History   Problem Relation Age of Onset     Heart Disease Mother      C.A.D. Mother      Heart Disease Father         MI     Blood Disease Father      Breast Cancer Sister        Social History:  Marital Status:   [2]  Social History     Tobacco Use     Smoking status: Former Smoker     Packs/day: 2.00     Years: 15.00     Pack years: 30.00     Types: Cigarettes     Start date: 1966     Last attempt to quit: 1983     Years since quittin.3     Smokeless tobacco: Never Used   Substance Use Topics     Alcohol use: No     Drug use: No        Medications:    ACIDOPHILUS LACTOBACILLUS PO  amitriptyline (ELAVIL) 25 MG tablet  APPLE CIDER VINEGAR PO  Ascorbic Acid (VITAMIN C PO)  aspirin 81 MG tablet  bulk laxative (BENEFIBER DRINK MIX) packet  Calcium Carbonate Antacid (TUMS PO)  carboxymethylcellulose (CARBOXYMETHYLCELLULOSE SODIUM) 0.5 % SOLN ophthalmic solution  cetirizine (ZYRTEC) 10 MG tablet  coenzyme Q-10 100 MG TABS  Cranberry (RA CRANBERRY) 500 MG CAPS  cyanocobalamin (VITAMIN B-12) 1000 MCG tablet  diclofenac (VOLTAREN) 1 % topical gel  fluocinonide (LIDEX) 0.05 % external cream  gabapentin (NEURONTIN) 300 MG capsule  GARLIC PO  hydrocortisone 2.5 % cream  KRILL OIL OMEGA-3 PO  Multiple Vitamins-Minerals (ONE-A-DAY PROACTIVE 65+ PO)  rosuvastatin (CRESTOR) 5 MG tablet  Turmeric Curcumin 500 MG CAPS  UNABLE TO FIND  UNABLE TO FIND  UNABLE TO FIND          Review of Systems   Constitutional: Negative for chills and fever.   HENT: Negative for congestion, ear pain, rhinorrhea, sore throat and trouble swallowing.    Respiratory: Negative for cough, shortness of breath and wheezing.    Cardiovascular: Negative for chest pain.   Gastrointestinal: Negative for abdominal pain, blood in stool, constipation, diarrhea, nausea and vomiting.   Genitourinary: Negative for difficulty urinating, flank pain, frequency, hematuria and urgency.   Musculoskeletal: Positive for  "myalgias. Negative for arthralgias.   Skin: Positive for wound. Negative for rash.   Neurological: Negative for dizziness, light-headedness and headaches.       Physical Exam   BP: (!) 159/72  Heart Rate: 88  Temp: 97.4  F (36.3  C)  Resp: 16  Height: 170.2 cm (5' 7\")  Weight: 77.1 kg (170 lb)  SpO2: 98 %      Physical Exam      There is a wound at the medial aspect of the left ankle.  The skin is primarily closed although there is some small area of exposed tissue centrally.  There is also a circumferential area of surrounding erythema.  There is a tenderness to touch here no obvious warmth or fluctuance.  Normal dorsalis pedis pulse.  Difficult to palpate the posterior tibial pulse because there is a painful to palpation.  Coloration of the toes is normal distal motor function sensation normal.  No proximal tenderness to palpation and no ascending lymphangitis.            ED Course        Procedures               Critical Care time:  none               Results for orders placed or performed during the hospital encounter of 10/17/19 (from the past 24 hour(s))   XR Ankle Left G/E 3 Views    Narrative    LEFT ANKLE THREE OR MORE VIEWS   10/17/2019 4:45 PM     HISTORY: Posteromedial venous ulcer non-healing. Increased pain.    COMPARISON: None.      Impression    IMPRESSION: Soft tissue calcifications consistent with venous  insufficiency. Osteopenia in the distal tibia with some patchy areas  of increased density may be related to osteopenia. Osteomyelitis  unlikely but difficult to exclude. Follow-up MRI could be helpful if  symptoms persist.    JERRICA MCELROY MD       Medications - No data to display       16:08 Patient assessed.      Assessments & Plan (with Medical Decision Making)       MDM: Sammie Heaton is a 69 year old female who presents with a chronically nonhealing venous stasis ulcer.  She is traveling currently and heading back to [place] over the weekend where she will resume follow-up care.  She " presented here with increasing pain in this region and an opening of the skin.  I see no significant findings on examination.  I have asked her to continue oral analgesics for the pain.  Differential diagnosis would certainly include a localized infection and therefore I obtained plain film x-ra to evaluate for possible osteomyelitis.y there is primarily osteopenia although osteomyelitis could still be on the differential and I noted she needed to follow-up in clinic back in [place] when she returns there.  In the meantime we will treat with oral doxycycline as she has previously had MRSA.  This would miss some strep bacteria, but with an open wound staff would be more likely.  There are no signs of peripheral arterial disease.      I have reviewed the nursing notes.    I have reviewed the findings, diagnosis, plan and need for follow up with the patient.       New Prescriptions    No medications on file       Final diagnoses:   Venous stasis ulcer of left ankle limited to breakdown of skin without varicose veins (H) - wound woith increased pain - localized rednbess - could be infected.  history of MRSA,  take doxycyclien for 7 days.  take home ibuprolfen 600 mg every 6 hours scheduled,. take tylenol 1000 mg every 6 hours scheduled.  return immed for worsening, fever.  Take oxycodone for breakthrough pain.  consider local lidocaine 4% patch on for 12 of every 24 horus.  follow-up in [place] for recheck.  consider mri to eval for infected bone - likely osteopenia changes on xray       This document serves as a record of the services and decisions personally performed and made by Rainer Zaragoza MD. It was created on HIS/HER behalf by Blanca Hair, a trained medical scribe. The creation of this document is based the provider's statements to the medical scribe.  Blanca Hair 4:30 PM 10/17/2019    Provider:   The information in this document, created by the medical scribe for me, accurately reflects the services I  personally performed and the decisions made by me. I have reviewed and approved this document for accuracy prior to leaving the patient care area.  Rainer Zaragoza MD 4:30 PM 10/17/2019    10/17/2019   [facility] EMERGENCY DEPARTMENT     Rainer Zaragoza MD  10/18/19 1123

## 2020-07-14 NOTE — NURSING NOTE
"Chief Complaint   Patient presents with     Urinary Problem     follow up dysuria from 5/26/17 visit, not getting better       Initial /70 (BP Location: Right arm, Patient Position: Chair, Cuff Size: Adult Large)  Pulse 85  Temp 98.1  F (36.7  C) (Tympanic)  Resp 16  Ht 5' 8\" (1.727 m)  Wt 175 lb (79.4 kg)  SpO2 98%  BMI 26.61 kg/m2 Estimated body mass index is 26.61 kg/(m^2) as calculated from the following:    Height as of this encounter: 5' 8\" (1.727 m).    Weight as of this encounter: 175 lb (79.4 kg).  Medication Reconciliation: complete   Earlene Ross LPN      "
14-Jul-2020 13:49

## 2020-07-24 ENCOUNTER — OFFICE VISIT (OUTPATIENT)
Dept: PODIATRY | Facility: OTHER | Age: 70
End: 2020-07-24
Attending: PODIATRIST
Payer: COMMERCIAL

## 2020-07-24 VITALS
OXYGEN SATURATION: 98 % | HEIGHT: 68 IN | TEMPERATURE: 97.5 F | BODY MASS INDEX: 26.83 KG/M2 | SYSTOLIC BLOOD PRESSURE: 129 MMHG | WEIGHT: 177 LBS | DIASTOLIC BLOOD PRESSURE: 70 MMHG | HEART RATE: 79 BPM

## 2020-07-24 DIAGNOSIS — G60.3 IDIOPATHIC PROGRESSIVE POLYNEUROPATHY: ICD-10-CM

## 2020-07-24 DIAGNOSIS — L84 CALLUS OF FOOT: Primary | ICD-10-CM

## 2020-07-24 DIAGNOSIS — I70.222 ATHEROSCLEROSIS OF NATIVE ARTERIES OF EXTREMITIES WITH REST PAIN, LEFT LEG (H): ICD-10-CM

## 2020-07-24 DIAGNOSIS — I73.9 PERIPHERAL VASCULAR DISEASE (H): ICD-10-CM

## 2020-07-24 DIAGNOSIS — I83.11 VARICOSE VEINS OF BOTH LOWER EXTREMITIES WITH INFLAMMATION: ICD-10-CM

## 2020-07-24 DIAGNOSIS — I87.2 CHRONIC VENOUS STASIS DERMATITIS OF BOTH LOWER EXTREMITIES: ICD-10-CM

## 2020-07-24 DIAGNOSIS — I73.9 PERIPHERAL ARTERIAL DISEASE (H): ICD-10-CM

## 2020-07-24 DIAGNOSIS — I83.12 VARICOSE VEINS OF BOTH LOWER EXTREMITIES WITH INFLAMMATION: ICD-10-CM

## 2020-07-24 DIAGNOSIS — I87.2 VENOUS STASIS DERMATITIS OF LEFT LOWER EXTREMITY: ICD-10-CM

## 2020-07-24 PROCEDURE — 11056 PARNG/CUTG B9 HYPRKR LES 2-4: CPT | Performed by: PODIATRIST

## 2020-07-24 PROCEDURE — G0463 HOSPITAL OUTPT CLINIC VISIT: HCPCS | Mod: 25

## 2020-07-24 PROCEDURE — 99203 OFFICE O/P NEW LOW 30 MIN: CPT | Mod: 25 | Performed by: PODIATRIST

## 2020-07-24 ASSESSMENT — MIFFLIN-ST. JEOR: SCORE: 1376.37

## 2020-07-24 ASSESSMENT — PAIN SCALES - GENERAL: PAINLEVEL: MILD PAIN (3)

## 2020-07-24 NOTE — PATIENT INSTRUCTIONS
Calluses on the bottom surface of the foot will continue to come back due to the pressure from the bone on the bottom of your foot. Below are some tips for keeping the callus(es) trimmed which often decreases the pain on the bottom of your foot.    -Callus care:  ---Do a daily epsom salt soak in lukewarm water for 20 minutes.   ---After the soak, the apply a moisturizing cream (ammonium lactate) to the callus and let it soak in for about ten minutes  ---Next, take an mingo board or nail file to or pumice stone the callus. This should be done daily (minimally lotion and callus paring) to keep the callus well pared.    Thank you for allowing  and our Podiatry team to participate in your care. Please call our office at [phone] with scheduling questions or with any other questions or concerns.

## 2020-07-24 NOTE — NURSING NOTE
"Chief Complaint   Patient presents with     Callouses       Initial /70 (BP Location: Left arm, Patient Position: Sitting, Cuff Size: Adult Regular)   Pulse 79   Temp 97.5  F (36.4  C) (Tympanic)   Ht 1.727 m (5' 8\")   Wt 80.3 kg (177 lb)   SpO2 98%   BMI 26.91 kg/m   Estimated body mass index is 26.91 kg/m  as calculated from the following:    Height as of this encounter: 1.727 m (5' 8\").    Weight as of this encounter: 80.3 kg (177 lb).  Medication Reconciliation: complete  Margaret Dias LPN  "

## 2020-07-24 NOTE — PROGRESS NOTES
Chief complaint: Patient presents with:  Callouses        History of Present Illness: This 70 year old female is seen at the request of No ref. provider found for evaluation and suggestions of management of bilateral plantar prominent calluses. The calluses have been present for as long as she can remember. She used to have a , but she quit using it. Her calluses looked worse when she called to schedule this appointment and they are not as bad today.    She had a vascular doctor in [place] that asked her to see a podiatrist in [place] to watch for ulcers under her callus.     No further pedal complaints today.     Patient does not use tobacco products. She quit when she was 31 years old.    History of vascular disease:  Patient just had her fourth LEFT leg procedure (May, 2020) (and a h/o of a procedure on her RIGHT leg) for vascular reasons. She has had compression socks for 40 years (40-50mmHg). She started developing venous stasis ulcerations around 2003 and she has been going to the [facility] since that time as well as a clinic in [place], [place]. She has had venous stasis in the bilateral legs (LEFT > RIGHT) with an extensive h/o venous stasis and operations to address her venous stasis.    Recently in May, 2020, patient says she had a procedure to address her blocked arteries in her bilateral legs (one leg was performed at a time). She was told she had 75% blockage in the arteries in the LEFT and 55% blockage in the arteries in her RIGHT leg.    Additionally, she developed an erythematous patch of skin on her LEFT distal leg around early July, 2020. It was initially treated as cellulitis, but it was determined to be more of an allergic reaction on 07/13/2020 by a dermatologist at the [facility] in [place], [place].  She has been doing Vinegar soaks and applying a steroid cream to the LEFT leg and she was told to discontinue the steroid cream on Tuesday, 07/28/2020. She is virtually following up with the  "dermatologist.    Patient does have pain to the LEFT distal leg due to her vascular disease. She also has burning, tingling, and numbness in her feet, the LEFT more than the RIGHT.    No further pedal complaints today.       /70 (BP Location: Left arm, Patient Position: Sitting, Cuff Size: Adult Regular)   Pulse 79   Temp 97.5  F (36.4  C) (Tympanic)   Ht 1.727 m (5' 8\")   Wt 80.3 kg (177 lb)   SpO2 98%   BMI 26.91 kg/m      Patient Active Problem List   Diagnosis     ACP (advance care planning)     Venous stasis ulcers of both lower extremities (H)       Past Surgical History:   Procedure Laterality Date     BLADDER SURGERY  07/31/2017    bladder repair done at [place]     COLONOSCOPY  11-     ENT SURGERY      tonsillectomy     GYN SURGERY  1987    hysterectomy     GYN SURGERY  2014    right ovary removed at [place]     PHACOEMULSIFICATION WITH STANDARD INTRAOCULAR LENS IMPLANT Left 9/10/2015    Procedure: PHACOEMULSIFICATION WITH STANDARD INTRAOCULAR LENS IMPLANT;  Surgeon: Ray Lucero MD;  Location: [facility]     PHACOEMULSIFICATION WITH STANDARD INTRAOCULAR LENS IMPLANT Right 9/24/2015    Procedure: PHACOEMULSIFICATION WITH STANDARD INTRAOCULAR LENS IMPLANT;  Surgeon: Ray Lucero MD;  Location: [facility] OR       Current Outpatient Medications   Medication     ACIDOPHILUS LACTOBACILLUS PO     Ascorbic Acid (VITAMIN C PO)     aspirin 81 MG tablet     Calcium Carbonate Antacid (TUMS PO)     carboxymethylcellulose (CARBOXYMETHYLCELLULOSE SODIUM) 0.5 % SOLN ophthalmic solution     cetirizine (ZYRTEC) 10 MG tablet     coenzyme Q-10 100 MG TABS     Cranberry (RA CRANBERRY) 500 MG CAPS     cyanocobalamin (VITAMIN B-12) 1000 MCG tablet     GARLIC PO     KRILL OIL OMEGA-3 PO     Multiple Vitamins-Minerals (ONE-A-DAY PROACTIVE 65+ PO)     Turmeric Curcumin 500 MG CAPS     UNABLE TO FIND     UNABLE TO FIND     amitriptyline (ELAVIL) 25 MG tablet     APPLE CIDER VINEGAR PO     bulk laxative (BENEFIBER DRINK " MIX) packet     diclofenac (VOLTAREN) 1 % topical gel     gabapentin (NEURONTIN) 300 MG capsule     hydrocortisone 2.5 % cream     oxyCODONE (ROXICODONE) 5 MG tablet     rosuvastatin (CRESTOR) 5 MG tablet     No current facility-administered medications for this visit.           Allergies   Allergen Reactions     Codeine      constipation     Sulfa Drugs Other (See Comments)     Flush; sulfonamide antibiotics       Family History   Problem Relation Age of Onset     Heart Disease Mother      C.A.D. Mother      Heart Disease Father         MI     Blood Disease Father      Breast Cancer Sister        Social History     Socioeconomic History     Marital status:      Spouse name: None     Number of children: None     Years of education: None     Highest education level: None   Occupational History     None   Social Needs     Financial resource strain: None     Food insecurity     Worry: None     Inability: None     Transportation needs     Medical: None     Non-medical: None   Tobacco Use     Smoking status: Former Smoker     Packs/day: 2.00     Years: 15.00     Pack years: 30.00     Types: Cigarettes     Start date: 1966     Last attempt to quit: 1983     Years since quittin.1     Smokeless tobacco: Never Used   Substance and Sexual Activity     Alcohol use: No     Drug use: No     Sexual activity: Yes     Partners: Female   Lifestyle     Physical activity     Days per week: None     Minutes per session: None     Stress: None   Relationships     Social connections     Talks on phone: None     Gets together: None     Attends Nondenominational service: None     Active member of club or organization: None     Attends meetings of clubs or organizations: None     Relationship status: None     Intimate partner violence     Fear of current or ex partner: None     Emotionally abused: None     Physically abused: None     Forced sexual activity: None   Other Topics Concern     Parent/sibling w/ CABG, MI or  angioplasty before 65F 55M? Yes     Comment: Father      Service No     Blood Transfusions Yes     Comment: Permits if needed     Caffeine Concern Yes     Comment: 2 cups     Occupational Exposure No     Hobby Hazards No     Sleep Concern Yes     Comment: difficulty sleeping     Stress Concern No     Weight Concern No     Special Diet No     Back Care No     Exercise No     Bike Helmet Not Asked     Seat Belt Yes     Self-Exams No   Social History Narrative     None       ROS: 10 point ROS neg other than the symptoms noted above in the HPI.  EXAM  Constitutional: healthy, alert and no distress    Psychiatric: mentation appears normal and affect normal/bright    VASCULAR:  -Dorsalis pedis pulse +2/4 b/l  ---Triphasic on doppler on 07/24/2020  -Posterior tibial pulse +0/4 b/l  ---Weakly biphasic on doppler bilaterally on 07/24/2020  -Capillary refill time < 3 seconds to b/l hallux  -Hair growth Absent to b/l anterior legs and ankles  -Varicosities and engorged veins to bilateral feet and legs  -Mild 1+ pitting edema to bilateral legs    NEURO:  -Protective sensation intact with SWM +10/10 RIGHT and +9/10 LEFT on 07/24/2020  -Light touch sensation intact to b/l plantar forefoot  DERM:  -Skin dry, shiny, atrophic to bilateral legs  -Increased warmth diffusely to bilateral feet and legs  -Punctate erythematous rash to LEFT lateral distal leg with clear lines of demarcation with dry, flaking skin -- fully blanchable    -Hyperkeratotic lesion to bilateral plantar 1st metatarsal head  -Skin temperature, texture and turgor WNL b/l  -Toenails elongated, thickened, dystrophic and discolored x 10  MSK:  -Muscle strength of ankles +5/5 for dorsiflexion, plantarflexion, ABDUction and ADDuction b/l    ============================================================    ASSESSMENT:  (L84) Callus of foot  (primary encounter diagnosis)    (I73.9) Peripheral arterial disease (H)    (I73.9) Peripheral vascular disease  (H)    (I83.11,  I83.12) Varicose veins of both lower extremities with inflammation    (I87.2) Venous stasis dermatitis of left lower extremity    (I87.2) Chronic venous stasis dermatitis of both lower extremities    (I70.222) Atherosclerosis of native arteries of extremities with rest pain, left leg (H)     (G60.3) Idiopathic progressive polyneuropathy        PLAN:  -Patient evaluated and examined. Treatment options discussed with no educational barriers noted.  -Callus pared x 2 to bilateral plantar 1st metatarsal head without incident  ---Patient reminded that the callus will likely return due to the underlying, prominent bone causing the callus while the patient is walking.  -High Risk Foot Education provided. This included checking the feet daily looking for new new blisters or wounds, wearing shoes at all times when walking including around the house, and avoiding lotion application between the toes. Any sign of infection in the foot warrant's the patient presenting to the ED as soon as possible.    -Patient is inquiring where she can have a full panel allergy test as requested by the [facility] -- sent a message to patient's PCP, Dr. Ibrahim, to ask where he recommends the patient receive this test and for his nurse to call her with the answer.  -Patient in agreement with the above treatment plan and all of patient's questions were answered.      RTC 63+ days for high risk callus paring  Patient may be leaving for [place] around October, 2020        Kristan Swan DPM

## 2020-07-30 ENCOUNTER — TELEPHONE (OUTPATIENT)
Dept: FAMILY MEDICINE | Facility: OTHER | Age: 70
End: 2020-07-30

## 2020-07-30 NOTE — TELEPHONE ENCOUNTER
Sammie needs an order for a true test patch for allergy wherever they may do these.  Also she would like extended testing (30+allergens)  She needs to test for allergies to her lorelei socks, laundry soap and medicine.

## 2020-07-31 ENCOUNTER — TELEPHONE (OUTPATIENT)
Dept: FAMILY MEDICINE | Facility: OTHER | Age: 70
End: 2020-07-31

## 2020-07-31 NOTE — TELEPHONE ENCOUNTER
Pt is requesting a call back from Dr. Ibrahim's nurse.   Pt is requesting order/referral for a specific type of allergy test.     Please call pt and discuss.

## 2020-07-31 NOTE — TELEPHONE ENCOUNTER
Pt states [facility] recommended she have true test patch testing done.  [facility] called [facility] and they do it there put have criteria for it.  Gave pt the allergy dept phone number to call [phone].  Explained she should have [facility] send referral.  She will call there and see if she meets criteria and call back if needed.

## 2021-05-25 ENCOUNTER — TELEPHONE (OUTPATIENT)
Dept: PODIATRY | Facility: OTHER | Age: 71
End: 2021-05-25

## 2021-06-10 ENCOUNTER — OFFICE VISIT (OUTPATIENT)
Dept: PODIATRY | Facility: OTHER | Age: 71
End: 2021-06-10
Attending: PODIATRIST
Payer: MEDICARE

## 2021-06-10 VITALS
TEMPERATURE: 96.8 F | DIASTOLIC BLOOD PRESSURE: 82 MMHG | OXYGEN SATURATION: 98 % | SYSTOLIC BLOOD PRESSURE: 136 MMHG | HEART RATE: 72 BPM

## 2021-06-10 DIAGNOSIS — G60.3 IDIOPATHIC PROGRESSIVE POLYNEUROPATHY: ICD-10-CM

## 2021-06-10 DIAGNOSIS — I87.2 VENOUS STASIS DERMATITIS OF LEFT LOWER EXTREMITY: ICD-10-CM

## 2021-06-10 DIAGNOSIS — L84 CALLUS OF FOOT: Primary | ICD-10-CM

## 2021-06-10 DIAGNOSIS — I87.2 CHRONIC VENOUS STASIS DERMATITIS OF BOTH LOWER EXTREMITIES: ICD-10-CM

## 2021-06-10 DIAGNOSIS — I73.9 PERIPHERAL VASCULAR DISEASE (H): ICD-10-CM

## 2021-06-10 DIAGNOSIS — I83.11 VARICOSE VEINS OF BOTH LOWER EXTREMITIES WITH INFLAMMATION: ICD-10-CM

## 2021-06-10 DIAGNOSIS — L60.3 ONYCHODYSTROPHY: ICD-10-CM

## 2021-06-10 DIAGNOSIS — I73.9 PERIPHERAL ARTERIAL DISEASE (H): ICD-10-CM

## 2021-06-10 DIAGNOSIS — I70.222 ATHEROSCLEROSIS OF NATIVE ARTERIES OF EXTREMITIES WITH REST PAIN, LEFT LEG (H): ICD-10-CM

## 2021-06-10 DIAGNOSIS — I83.12 VARICOSE VEINS OF BOTH LOWER EXTREMITIES WITH INFLAMMATION: ICD-10-CM

## 2021-06-10 PROBLEM — S81.809A OPEN WOUND OF LOWER LEG: Status: ACTIVE | Noted: 2020-06-11

## 2021-06-10 PROBLEM — R92.8 ABNORMAL MAMMOGRAM: Status: ACTIVE | Noted: 2019-05-08

## 2021-06-10 PROBLEM — H26.499 AFTER-CATARACT WITH VISION OBSCURED: Status: ACTIVE | Noted: 2020-07-14

## 2021-06-10 PROBLEM — Z15.01 POSITIVE TEST FOR GENETIC MARKER OF SUSCEPTIBILITY TO MALIGNANT NEOPLASM OF BREAST: Status: ACTIVE | Noted: 2019-05-06

## 2021-06-10 PROBLEM — M19.049 ARTHRITIS OF HAND: Status: ACTIVE | Noted: 2019-05-08

## 2021-06-10 PROBLEM — R51.9 HEADACHE: Status: ACTIVE | Noted: 2019-05-08

## 2021-06-10 PROBLEM — Z00.00 GENERAL MEDICAL EXAM: Status: ACTIVE | Noted: 2017-04-26

## 2021-06-10 PROCEDURE — G0463 HOSPITAL OUTPT CLINIC VISIT: HCPCS | Mod: 25

## 2021-06-10 PROCEDURE — 11721 DEBRIDE NAIL 6 OR MORE: CPT | Mod: 59 | Performed by: PODIATRIST

## 2021-06-10 PROCEDURE — 99213 OFFICE O/P EST LOW 20 MIN: CPT | Mod: 25 | Performed by: PODIATRIST

## 2021-06-10 PROCEDURE — 11056 PARNG/CUTG B9 HYPRKR LES 2-4: CPT | Performed by: PODIATRIST

## 2021-06-10 RX ORDER — AMLODIPINE BESYLATE 5 MG/1
TABLET ORAL
Status: ON HOLD | COMMUNITY
Start: 2021-04-18 | End: 2024-05-27

## 2021-06-10 RX ORDER — TACROLIMUS 1 MG/G
OINTMENT TOPICAL 2 TIMES DAILY
Status: ON HOLD | COMMUNITY
End: 2024-05-27

## 2021-06-10 RX ORDER — DONEPEZIL HYDROCHLORIDE 5 MG/1
TABLET, FILM COATED ORAL
Status: ON HOLD | COMMUNITY
Start: 2021-05-18 | End: 2024-05-27

## 2021-06-10 RX ORDER — FLUOXETINE 10 MG/1
TABLET, FILM COATED ORAL
Status: ON HOLD | COMMUNITY
Start: 2021-05-15 | End: 2024-05-27

## 2021-06-10 RX ORDER — LOSARTAN POTASSIUM 50 MG/1
50 TABLET ORAL 2 TIMES DAILY
Status: ON HOLD | COMMUNITY
Start: 2021-06-06 | End: 2024-05-27

## 2021-06-10 ASSESSMENT — PAIN SCALES - GENERAL: PAINLEVEL: NO PAIN (0)

## 2021-06-10 NOTE — PROGRESS NOTES
Chief complaint: Patient presents with:  Callouses  toenail trim        History of Present Illness: This 70 year old female is seen for follow-up management of bilateral plantar prominent calluses. The calluses have been present for as long as she can remember. She used to have a , but she quit using it. Her calluses looked worse when she called to schedule this appointment and they are not as bad today.    She had a vascular doctor in [place] that asked her to see a podiatrist in [place] to watch for ulcers under her callus.     She got back from [place] a month ago. She saw a podiatrist on 04/08/2021, but she says he didn't trim the toenails or the calluses. She did have a clogged sweat gland and he dug out the callus, but he didn't address the rest of them.    No further pedal complaints today.     Patient does not use tobacco products. She quit when she was 31 years old.      History of vascular disease:  Patient just had her fourth LEFT leg procedure (May, 2020) (and a h/o of a procedure on her RIGHT leg) for vascular reasons. She has had compression socks for 40 years (40-50mmHg). She started developing venous stasis ulcerations around 2003 and she has been going to the [facility] since that time as well as a clinic in [place], [place]. She has had venous stasis in the bilateral legs (LEFT > RIGHT) with an extensive h/o venous stasis and operations to address her venous stasis.    Recently in May, 2020, patient says she had a procedure to address her blocked arteries in her bilateral legs (one leg was performed at a time). She was told she had 75% blockage in the arteries in the LEFT and 55% blockage in the arteries in her RIGHT leg.    Additionally, she developed an erythematous patch of skin on her LEFT distal leg around early July, 2020. It was initially treated as cellulitis, but it was determined to be more of an allergic reaction on 07/13/2020 by a dermatologist at the [facility] in [place], [place].  She  has been doing Vinegar soaks and applying a steroid cream to the LEFT leg and she was told to discontinue the steroid cream on Tuesday, 07/28/2020. She is virtually following up with the dermatologist.    Patient does have pain to the LEFT distal leg due to her vascular disease. She also has burning, tingling, and numbness in her feet, the LEFT more than the RIGHT.      /82   Pulse 72   Temp 96.8  F (36  C)   SpO2 98%     Patient Active Problem List   Diagnosis     ACP (advance care planning)     Venous stasis ulcers of both lower extremities (H)     Abnormal mammogram     After-cataract with vision obscured     Amphetamine user (H)     Arthritis of hand     Basal cell papilloma     Disorder of respiratory system exacerbated by aspirin     Essential hypertension     Headache     Hyperlipidemia     Idiopathic hypersomnia without long sleep time     Livedoid vasculopathy     Malignant neoplasm of female breast (H)     Open wound of lower leg     Osteopenia     Peripheral venous insufficiency     Positive test for genetic marker of susceptibility to malignant neoplasm of breast     General medical exam       Past Surgical History:   Procedure Laterality Date     BLADDER SURGERY  07/31/2017    bladder repair done at [place]     COLONOSCOPY  11-     ENT SURGERY      tonsillectomy     GYN SURGERY  1987    hysterectomy     GYN SURGERY  2014    right ovary removed at [place]     PHACOEMULSIFICATION WITH STANDARD INTRAOCULAR LENS IMPLANT Left 9/10/2015    Procedure: PHACOEMULSIFICATION WITH STANDARD INTRAOCULAR LENS IMPLANT;  Surgeon: Ray Lucero MD;  Location: [facility]     PHACOEMULSIFICATION WITH STANDARD INTRAOCULAR LENS IMPLANT Right 9/24/2015    Procedure: PHACOEMULSIFICATION WITH STANDARD INTRAOCULAR LENS IMPLANT;  Surgeon: Ray Lucero MD;  Location: [facility] OR       Current Outpatient Medications   Medication     amitriptyline (ELAVIL) 25 MG tablet     amLODIPine (NORVASC) 5 MG tablet     Ascorbic Acid  (VITAMIN C PO)     aspirin 81 MG tablet     Benzocaine-Menthol (DERMOPLAST EX)     bulk laxative (BENEFIBER DRINK MIX) packet     carboxymethylcellulose (CARBOXYMETHYLCELLULOSE SODIUM) 0.5 % SOLN ophthalmic solution     cetirizine (ZYRTEC) 10 MG tablet     coenzyme Q-10 100 MG TABS     Cranberry (RA CRANBERRY) 500 MG CAPS     cyanocobalamin (VITAMIN B-12) 1000 MCG tablet     donepezil (ARICEPT) 5 MG tablet     FLUoxetine (PROZAC) 10 MG tablet     GARLIC PO     KRILL OIL OMEGA-3 PO     Lactobacillus (ACIDOPHILUS PROBIOTIC PO)     losartan (COZAAR) 50 MG tablet     Multiple Vitamins-Minerals (ONE-A-DAY PROACTIVE 65+ PO)     rosuvastatin (CRESTOR) 5 MG tablet     tacrolimus (PROTOPIC) 0.1 % external ointment     Turmeric Curcumin 500 MG CAPS     UNABLE TO FIND     UNABLE TO FIND     diclofenac (VOLTAREN) 1 % topical gel     No current facility-administered medications for this visit.           Allergies   Allergen Reactions     Codeine      constipation     Sulfa Drugs Other (See Comments)     Flush; sulfonamide antibiotics       Family History   Problem Relation Age of Onset     Heart Disease Mother      C.A.D. Mother      Heart Disease Father         MI     Blood Disease Father      Breast Cancer Sister        Social History     Socioeconomic History     Marital status:      Spouse name: None     Number of children: None     Years of education: None     Highest education level: None   Occupational History     None   Social Needs     Financial resource strain: None     Food insecurity     Worry: None     Inability: None     Transportation needs     Medical: None     Non-medical: None   Tobacco Use     Smoking status: Former Smoker     Packs/day: 2.00     Years: 15.00     Pack years: 30.00     Types: Cigarettes     Start date: 1966     Last attempt to quit: 1983     Years since quittin.1     Smokeless tobacco: Never Used   Substance and Sexual Activity     Alcohol use: No     Drug use: No      Sexual activity: Yes     Partners: Female   Lifestyle     Physical activity     Days per week: None     Minutes per session: None     Stress: None   Relationships     Social connections     Talks on phone: None     Gets together: None     Attends Synagogue service: None     Active member of club or organization: None     Attends meetings of clubs or organizations: None     Relationship status: None     Intimate partner violence     Fear of current or ex partner: None     Emotionally abused: None     Physically abused: None     Forced sexual activity: None   Other Topics Concern     Parent/sibling w/ CABG, MI or angioplasty before 65F 55M? Yes     Comment: Father      Service No     Blood Transfusions Yes     Comment: Permits if needed     Caffeine Concern Yes     Comment: 2 cups     Occupational Exposure No     Hobby Hazards No     Sleep Concern Yes     Comment: difficulty sleeping     Stress Concern No     Weight Concern No     Special Diet No     Back Care No     Exercise No     Bike Helmet Not Asked     Seat Belt Yes     Self-Exams No   Social History Narrative     None       ROS: 10 point ROS neg other than the symptoms noted above in the HPI.  EXAM  Constitutional: healthy, alert and no distress    Psychiatric: mentation appears normal and affect normal/bright    VASCULAR:  -Dorsalis pedis pulse +1/4 b/l  ---Triphasic on doppler on 07/24/2020  -Posterior tibial pulse +0/4 b/l  ---Weakly biphasic on doppler bilaterally on 07/24/2020  -Capillary refill time < 3 seconds to b/l hallux  -Hair growth Absent to b/l anterior legs and ankles  -Varicosities and engorged veins to bilateral feet and legs  -Mild 1+ pitting edema to bilateral legs    NEURO:  -Protective sensation intact with SWM +9/10 RIGHT and +10/10 LEFT on 06/10/2021  -Protective sensation intact with SWM +10/10 RIGHT and +9/10 LEFT on 07/24/2020  -Light touch sensation intact to b/l plantar forefoot  DERM:  -Skin dry, shiny, atrophic to bilateral  legs  -Increased warmth diffusely to bilateral feet and legs  -Punctate erythematous rash to LEFT lateral distal leg with clear lines of demarcation with dry, flaking skin -- fully blanchable    -Hyperkeratotic lesion to bilateral plantar 1st metatarsal head  -Hyperkeratotic lesion to LEFT medial hallux IPJ  -Skin temperature, texture and turgor WNL b/l  -Toenails elongated, thickened, dystrophic and discolored x 10  MSK:  -Muscle strength of ankles +5/5 for dorsiflexion, plantarflexion, ABDUction and ADDuction b/l    ============================================================    ASSESSMENT:  (L84) Callus of foot  (primary encounter diagnosis)    (L60.3) Onychodystrophy    (I73.9) Peripheral arterial disease (H)    (I73.9) Peripheral vascular disease (H)    (I83.11,  I83.12) Varicose veins of both lower extremities with inflammation    (I87.2) Venous stasis dermatitis of left lower extremity    (I87.2) Chronic venous stasis dermatitis of both lower extremities    (I70.222) Atherosclerosis of native arteries of extremities with rest pain, left leg (H)     (G60.3) Idiopathic progressive polyneuropathy        PLAN:  -Patient evaluated and examined. Treatment options discussed with no educational barriers noted.    -Nails debrided x 10 without incident    -Callus pared x 3 to bilateral plantar 1st metatarsal head and the LEFT medial hallux IPJ without incident  ---Patient reminded that the callus will likely return due to the underlying, prominent bone causing the callus while the patient is walking.  -High Risk Foot Education provided. This included checking the feet daily looking for new new blisters or wounds, wearing shoes at all times when walking including around the house, and avoiding lotion application between the toes. Any sign of infection in the foot warrant's the patient presenting to the ED as soon as possible.  -Vascular: She still follows up with a vascular surgeon in [place], [place], and the [facility]  in [place].  -Patient in agreement with the above treatment plan and all of patient's questions were answered.      RTC 63+ days for high risk callus paring (next two appointment set up today)  Patient leaves for [place] around October, 2021        Kristan Swan DPM

## 2021-08-02 NOTE — TELEPHONE ENCOUNTER
Patient called and wanted to get schedule with Dr. Swan in June please call back at [phone].  
done

## 2021-08-17 ENCOUNTER — OFFICE VISIT (OUTPATIENT)
Dept: PODIATRY | Facility: OTHER | Age: 71
End: 2021-08-17
Attending: PODIATRIST
Payer: MEDICARE

## 2021-08-17 VITALS
HEART RATE: 70 BPM | SYSTOLIC BLOOD PRESSURE: 124 MMHG | HEIGHT: 68 IN | WEIGHT: 177 LBS | BODY MASS INDEX: 26.83 KG/M2 | DIASTOLIC BLOOD PRESSURE: 72 MMHG | TEMPERATURE: 97.2 F | OXYGEN SATURATION: 98 %

## 2021-08-17 DIAGNOSIS — I70.222 ATHEROSCLEROSIS OF NATIVE ARTERIES OF EXTREMITIES WITH REST PAIN, LEFT LEG (H): ICD-10-CM

## 2021-08-17 DIAGNOSIS — I83.12 VARICOSE VEINS OF BOTH LOWER EXTREMITIES WITH INFLAMMATION: ICD-10-CM

## 2021-08-17 DIAGNOSIS — L60.3 ONYCHODYSTROPHY: ICD-10-CM

## 2021-08-17 DIAGNOSIS — I73.9 PERIPHERAL VASCULAR DISEASE (H): ICD-10-CM

## 2021-08-17 DIAGNOSIS — I87.2 CHRONIC VENOUS STASIS DERMATITIS OF BOTH LOWER EXTREMITIES: ICD-10-CM

## 2021-08-17 DIAGNOSIS — I87.2 VENOUS STASIS DERMATITIS OF LEFT LOWER EXTREMITY: ICD-10-CM

## 2021-08-17 DIAGNOSIS — R21 RASH OF FOOT: ICD-10-CM

## 2021-08-17 DIAGNOSIS — I73.9 PERIPHERAL ARTERIAL DISEASE (H): ICD-10-CM

## 2021-08-17 DIAGNOSIS — G60.3 IDIOPATHIC PROGRESSIVE POLYNEUROPATHY: ICD-10-CM

## 2021-08-17 DIAGNOSIS — I83.11 VARICOSE VEINS OF BOTH LOWER EXTREMITIES WITH INFLAMMATION: ICD-10-CM

## 2021-08-17 DIAGNOSIS — L84 CALLUS OF FOOT: Primary | ICD-10-CM

## 2021-08-17 PROCEDURE — 11721 DEBRIDE NAIL 6 OR MORE: CPT | Performed by: PODIATRIST

## 2021-08-17 PROCEDURE — G0463 HOSPITAL OUTPT CLINIC VISIT: HCPCS

## 2021-08-17 PROCEDURE — 99213 OFFICE O/P EST LOW 20 MIN: CPT | Mod: 25 | Performed by: PODIATRIST

## 2021-08-17 PROCEDURE — G0463 HOSPITAL OUTPT CLINIC VISIT: HCPCS | Mod: 25

## 2021-08-17 PROCEDURE — 11056 PARNG/CUTG B9 HYPRKR LES 2-4: CPT | Performed by: PODIATRIST

## 2021-08-17 ASSESSMENT — PAIN SCALES - GENERAL: PAINLEVEL: NO PAIN (0)

## 2021-08-17 ASSESSMENT — MIFFLIN-ST. JEOR: SCORE: 1371.37

## 2021-08-17 NOTE — NURSING NOTE
"Chief Complaint   Patient presents with     calluses and toenails       Initial /72   Pulse 70   Temp 97.2  F (36.2  C) (Tympanic)   Ht 1.727 m (5' 8\")   Wt 80.3 kg (177 lb)   SpO2 98%   BMI 26.91 kg/m   Estimated body mass index is 26.91 kg/m  as calculated from the following:    Height as of this encounter: 1.727 m (5' 8\").    Weight as of this encounter: 80.3 kg (177 lb).  Medication Reconciliation: complete  Gladys Milton LPN    "

## 2021-08-17 NOTE — PROGRESS NOTES
Chief complaint: Patient presents with:  calluses and toenails      History of Present Illness: This 71 year old female is seen for follow-up management of bilateral plantar prominent calluses. The calluses have been present for as long as she can remember. She used to have a , but she quit using it. She is also here for a high risk nail debridement.    She had a vascular doctor in [place] that asked her to see a podiatrist in [place] to watch for ulcers under her callus.     She will be returning to [place] on 10/15/2021. She saw a podiatrist in [place] on 04/08/2021, but she says he didn't trim the toenails or the calluses. She did have a clogged sweat gland and he dug out the callus, but he didn't address the rest of the calluses or the toenails. The calluses are painful when she is walking.    No further pedal complaints today.     Patient does not use tobacco products. She quit when she was 31 years old.      History of vascular disease:  Patient just had her fourth LEFT leg procedure (May, 2020) (and a h/o of a procedure on her RIGHT leg) for vascular reasons. She has had compression socks for 40 years (40-50mmHg). She started developing venous stasis ulcerations around 2003 and she has been going to the [facility] since that time as well as a clinic in [place], [place]. She has had venous stasis in the bilateral legs (LEFT > RIGHT) with an extensive h/o venous stasis and operations to address her venous stasis.    Recently in May, 2020, patient says she had a procedure to address her blocked arteries in her bilateral legs (one leg was performed at a time). She was told she had 75% blockage in the arteries in the LEFT and 55% blockage in the arteries in her RIGHT leg.    Additionally, she developed an erythematous patch of skin on her LEFT distal leg around early July, 2020. It was initially treated as cellulitis, but it was determined to be more of an allergic reaction on 07/13/2020 by a dermatologist at the [facility] in [place], [place].  She had been doing Vinegar soaks and applying a steroid cream to the LEFT leg and she was told to discontinue the steroid cream on Tuesday, 07/28/2020. She was virtually following up with the dermatologist. Today she says that she still has some red patches of skin on her bilateral foot and leg. The patches are not draining, not painful and not itching. She is wondering how she can treat the areas of skin.    Patient does have pain to the LEFT distal leg due to her vascular disease. She also has burning, tingling, and numbness in her feet, the LEFT more than the RIGHT.    No further pedal complaints today.       /72   Pulse 70   Temp 97.2  F (36.2  C) (Tympanic)   Ht 1.727 m (5' 8\")   Wt 80.3 kg (177 lb)   SpO2 98%   BMI 26.91 kg/m      Patient Active Problem List   Diagnosis     ACP (advance care planning)     Venous stasis ulcers of both lower extremities (H)     Abnormal mammogram     After-cataract with vision obscured     Amphetamine user (H)     Arthritis of hand     Basal cell papilloma     Disorder of respiratory system exacerbated by aspirin     Essential hypertension     Headache     Hyperlipidemia     Idiopathic hypersomnia without long sleep time     Livedoid vasculopathy     Malignant neoplasm of female breast (H)     Open wound of lower leg     Osteopenia     Peripheral venous insufficiency     Positive test for genetic marker of susceptibility to malignant neoplasm of breast     General medical exam       Past Surgical History:   Procedure Laterality Date     BLADDER SURGERY  07/31/2017    bladder repair done at [facility]     COLONOSCOPY  11-     ENT SURGERY      tonsillectomy     GYN SURGERY  1987    hysterectomy     GYN SURGERY  2014    right ovary removed at [facility]     PHACOEMULSIFICATION WITH STANDARD INTRAOCULAR LENS IMPLANT Left 9/10/2015    Procedure: PHACOEMULSIFICATION WITH STANDARD INTRAOCULAR LENS IMPLANT;  Surgeon: Ray Lucero MD;  Location: [facility] " OR     PHACOEMULSIFICATION WITH STANDARD INTRAOCULAR LENS IMPLANT Right 9/24/2015    Procedure: PHACOEMULSIFICATION WITH STANDARD INTRAOCULAR LENS IMPLANT;  Surgeon: Ray Lucero MD;  Location: [facility] OR       Current Outpatient Medications   Medication     amitriptyline (ELAVIL) 25 MG tablet     amLODIPine (NORVASC) 5 MG tablet     Ascorbic Acid (VITAMIN C PO)     aspirin 81 MG tablet     Benzocaine-Menthol (DERMOPLAST EX)     bulk laxative (BENEFIBER DRINK MIX) packet     carboxymethylcellulose (CARBOXYMETHYLCELLULOSE SODIUM) 0.5 % SOLN ophthalmic solution     cetirizine (ZYRTEC) 10 MG tablet     coenzyme Q-10 100 MG TABS     Cranberry (RA CRANBERRY) 500 MG CAPS     cyanocobalamin (VITAMIN B-12) 1000 MCG tablet     diclofenac (VOLTAREN) 1 % topical gel     donepezil (ARICEPT) 5 MG tablet     FLUoxetine (PROZAC) 10 MG tablet     GARLIC PO     KRILL OIL OMEGA-3 PO     Lactobacillus (ACIDOPHILUS PROBIOTIC PO)     losartan (COZAAR) 50 MG tablet     Multiple Vitamins-Minerals (ONE-A-DAY PROACTIVE 65+ PO)     tacrolimus (PROTOPIC) 0.1 % external ointment     Turmeric Curcumin 500 MG CAPS     UNABLE TO FIND     UNABLE TO FIND     rosuvastatin (CRESTOR) 5 MG tablet     No current facility-administered medications for this visit.          Allergies   Allergen Reactions     Codeine      constipation     Sulfa Drugs Other (See Comments)     Flush; sulfonamide antibiotics       Family History   Problem Relation Age of Onset     Heart Disease Mother      C.A.D. Mother      Heart Disease Father         MI     Blood Disease Father      Breast Cancer Sister        Social History     Socioeconomic History     Marital status:      Spouse name: None     Number of children: None     Years of education: None     Highest education level: None   Occupational History     None   Social Needs     Financial resource strain: None     Food insecurity     Worry: None     Inability: None     Transportation needs     Medical: None      Non-medical: None   Tobacco Use     Smoking status: Former Smoker     Packs/day: 2.00     Years: 15.00     Pack years: 30.00     Types: Cigarettes     Start date: 1966     Last attempt to quit: 1983     Years since quittin.1     Smokeless tobacco: Never Used   Substance and Sexual Activity     Alcohol use: No     Drug use: No     Sexual activity: Yes     Partners: Female   Lifestyle     Physical activity     Days per week: None     Minutes per session: None     Stress: None   Relationships     Social connections     Talks on phone: None     Gets together: None     Attends Confucianism service: None     Active member of club or organization: None     Attends meetings of clubs or organizations: None     Relationship status: None     Intimate partner violence     Fear of current or ex partner: None     Emotionally abused: None     Physically abused: None     Forced sexual activity: None   Other Topics Concern     Parent/sibling w/ CABG, MI or angioplasty before 65F 55M? Yes     Comment: Father      Service No     Blood Transfusions Yes     Comment: Permits if needed     Caffeine Concern Yes     Comment: 2 cups     Occupational Exposure No     Hobby Hazards No     Sleep Concern Yes     Comment: difficulty sleeping     Stress Concern No     Weight Concern No     Special Diet No     Back Care No     Exercise No     Bike Helmet Not Asked     Seat Belt Yes     Self-Exams No   Social History Narrative     None       ROS: 10 point ROS neg other than the symptoms noted above in the HPI.  EXAM  Constitutional: healthy, alert and no distress    Psychiatric: mentation appears normal and affect normal/bright    VASCULAR:  -Dorsalis pedis pulse +1/4 b/l  ---Triphasic on doppler on 2020  -Posterior tibial pulse +0/4 b/l  ---Weakly biphasic on doppler bilaterally on 2020  -Capillary refill time < 3 seconds to b/l hallux  -Hair growth Absent to b/l anterior legs and ankles  -Varicosities and engorged  veins to bilateral feet and legs  -Mild 1+ pitting edema to bilateral legs    NEURO:  -Protective sensation intact with SWM +9/10 RIGHT and +10/10 LEFT on 06/10/2021  -Protective sensation intact with SWM +10/10 RIGHT and +9/10 LEFT on 07/24/2020  -Light touch sensation intact to b/l plantar forefoot  DERM:  -Skin dry, shiny, atrophic to bilateral legs  -Increased warmth diffusely to bilateral feet and legs  -Minimal punctate erythematous rash to bilateral lateral distal leg with clear lines of demarcation with dry, flaking skin -- fully blanchable    -Hyperkeratotic lesion to the LEFT plantar medial 1st metatarsal head  -Hyperkeratotic lesion to the LEFT medial hallux IPJ  -Hyperkeratotic lesion to the LEFT plantar 5th metatarsal head  -Hyperkeratotic lesion to the RIGHT plantar 1st metatarsal head  -Skin temperature, texture and turgor WNL b/l  -Toenails elongated, thickened, dystrophic and discolored x 10  MSK:  -Muscle strength of ankles +5/5 for dorsiflexion, plantarflexion, ABDUction and ADDuction b/l    ============================================================    ASSESSMENT:  (L84) Callus of foot  (primary encounter diagnosis)    (L60.3) Onychodystrophy    (I73.9) Peripheral arterial disease (H)    (I73.9) Peripheral vascular disease (H)    (I83.11,  I83.12) Varicose veins of both lower extremities with inflammation    (I87.2) Venous stasis dermatitis of left lower extremity    (I87.2) Chronic venous stasis dermatitis of both lower extremities    (I70.222) Atherosclerosis of native arteries of extremities with rest pain, left leg (H)     (G60.3) Idiopathic progressive polyneuropathy        PLAN:  -Patient evaluated and examined. Treatment options discussed with no educational barriers noted.    -High risk toenail debridement x 10 toenails without incident    -Callus pared x 4 to the LEFT medial plantar 1st metatarsal head, LEFT medial hallux IPJ, LEFT plantar 5th metatarsal head and the RIGHT plantar 1st  metatarsal head  ---Patient reminded that the callus will likely return due to the underlying, prominent bone causing the callus while the patient is walking.    -Rash of foot: Patient is advised to try consistently using a Eucerin cream on the red patches of her foot and distal leg. She may try this twice a day for several weeks (at least two weeks in a row). If any pain or itching develops, then she should call her PCP or dermatologist for recommendations, but she may try the Eucerin cream consistently.  ---Patient to watch the rash closely for worsening.    -High Risk Foot Education provided. This included checking the feet daily looking for new new blisters or wounds, wearing shoes at all times when walking including around the house, and avoiding lotion application between the toes. Any sign of infection in the foot warrant's the patient presenting to the ED as soon as possible.  -Vascular: She still follows up with a vascular surgeon in [place], [place], and the [facility] in [place].    Patient leaves for [place] on 10/15/2021 -- this is one week prior to her 63+ day high risk nail debridement. She is advised to call her insurance to see if the nail debridement would be covered this early, otherwise she may not have coverage for the visit prior to 10/15/2021. She will call the clinic if she needs to cancel the appointment. She will also call to schedule a follow-up around the end of May / early June, 2022, when she returns from [place]    -Patient in agreement with the above treatment plan and all of patient's questions were answered.      Kristan Swan DPM

## 2023-09-25 NOTE — TELEPHONE ENCOUNTER
I called the pt and notified we do not have catheter supplies needed. Debbie Gonzalez LPN has agreed to collect the sample. The pt is advised to make sure her Urologist gets an order faxed to the [facility] lab ASAP. Pt advised she may go for a nurse visit between 1 and 4 pm at the latest to get her urine sample taken.   non-tender

## 2024-05-27 ENCOUNTER — APPOINTMENT (OUTPATIENT)
Dept: CT IMAGING | Facility: OTHER | Age: 74
DRG: 391 | End: 2024-05-27
Attending: FAMILY MEDICINE
Payer: COMMERCIAL

## 2024-05-27 ENCOUNTER — HOSPITAL ENCOUNTER (INPATIENT)
Facility: OTHER | Age: 74
LOS: 3 days | Discharge: HOME | DRG: 391 | End: 2024-05-30
Attending: FAMILY MEDICINE | Admitting: INTERNAL MEDICINE
Payer: COMMERCIAL

## 2024-05-27 DIAGNOSIS — K52.9 ACUTE COLITIS: ICD-10-CM

## 2024-05-27 PROBLEM — K86.89 PANCREATIC CALCIFICATION: Status: ACTIVE | Noted: 2024-05-27

## 2024-05-27 PROBLEM — I10 ESSENTIAL HYPERTENSION: Status: ACTIVE | Noted: 2020-06-11

## 2024-05-27 PROBLEM — C50.919 MALIGNANT NEOPLASM OF FEMALE BREAST (H): Status: ACTIVE | Noted: 2019-05-21

## 2024-05-27 LAB
ABO/RH(D): NORMAL
ALBUMIN SERPL BCG-MCNC: 4.5 G/DL (ref 3.5–5.2)
ALBUMIN SERPL BCG-MCNC: 5.1 G/DL (ref 3.5–5.2)
ALBUMIN UR-MCNC: NEGATIVE MG/DL
ALP SERPL-CCNC: 79 U/L (ref 40–150)
ALP SERPL-CCNC: 88 U/L (ref 40–150)
ALT SERPL W P-5'-P-CCNC: 26 U/L (ref 0–50)
ALT SERPL W P-5'-P-CCNC: 31 U/L (ref 0–50)
ANION GAP SERPL CALCULATED.3IONS-SCNC: 11 MMOL/L (ref 7–15)
ANION GAP SERPL CALCULATED.3IONS-SCNC: 14 MMOL/L (ref 7–15)
ANTIBODY SCREEN: NEGATIVE
APPEARANCE UR: CLEAR
AST SERPL W P-5'-P-CCNC: 20 U/L (ref 0–45)
AST SERPL W P-5'-P-CCNC: 22 U/L (ref 0–45)
BASOPHILS # BLD AUTO: 0.1 10E3/UL (ref 0–0.2)
BASOPHILS # BLD AUTO: 0.1 10E3/UL (ref 0–0.2)
BASOPHILS NFR BLD AUTO: 0 %
BASOPHILS NFR BLD AUTO: 1 %
BILIRUB SERPL-MCNC: 0.3 MG/DL
BILIRUB SERPL-MCNC: 0.5 MG/DL
BILIRUB UR QL STRIP: NEGATIVE
BUN SERPL-MCNC: 14.9 MG/DL (ref 8–23)
BUN SERPL-MCNC: 19.7 MG/DL (ref 8–23)
CALCIUM SERPL-MCNC: 10.5 MG/DL (ref 8.8–10.2)
CALCIUM SERPL-MCNC: 9.5 MG/DL (ref 8.8–10.2)
CHLORIDE SERPL-SCNC: 105 MMOL/L (ref 98–107)
CHLORIDE SERPL-SCNC: 109 MMOL/L (ref 98–107)
COLOR UR AUTO: YELLOW
CREAT SERPL-MCNC: 0.55 MG/DL (ref 0.51–0.95)
CREAT SERPL-MCNC: 0.61 MG/DL (ref 0.51–0.95)
DEPRECATED HCO3 PLAS-SCNC: 21 MMOL/L (ref 22–29)
DEPRECATED HCO3 PLAS-SCNC: 22 MMOL/L (ref 22–29)
EGFRCR SERPLBLD CKD-EPI 2021: >90 ML/MIN/1.73M2
EGFRCR SERPLBLD CKD-EPI 2021: >90 ML/MIN/1.73M2
EOSINOPHIL # BLD AUTO: 0.2 10E3/UL (ref 0–0.7)
EOSINOPHIL # BLD AUTO: 0.2 10E3/UL (ref 0–0.7)
EOSINOPHIL NFR BLD AUTO: 2 %
EOSINOPHIL NFR BLD AUTO: 2 %
ERYTHROCYTE [DISTWIDTH] IN BLOOD BY AUTOMATED COUNT: 12.3 % (ref 10–15)
ERYTHROCYTE [DISTWIDTH] IN BLOOD BY AUTOMATED COUNT: 12.3 % (ref 10–15)
GLUCOSE SERPL-MCNC: 114 MG/DL (ref 70–99)
GLUCOSE SERPL-MCNC: 97 MG/DL (ref 70–99)
GLUCOSE UR STRIP-MCNC: NEGATIVE MG/DL
HCT VFR BLD AUTO: 34.2 % (ref 35–47)
HCT VFR BLD AUTO: 36.6 % (ref 35–47)
HGB BLD-MCNC: 11.2 G/DL (ref 11.7–15.7)
HGB BLD-MCNC: 12.5 G/DL (ref 11.7–15.7)
HGB UR QL STRIP: NEGATIVE
HOLD SPECIMEN: NORMAL
IMM GRANULOCYTES # BLD: 0 10E3/UL
IMM GRANULOCYTES # BLD: 0 10E3/UL
IMM GRANULOCYTES NFR BLD: 0 %
IMM GRANULOCYTES NFR BLD: 0 %
INR PPP: 0.89 (ref 0.85–1.15)
KETONES UR STRIP-MCNC: NEGATIVE MG/DL
LACTATE SERPL-SCNC: 2.2 MMOL/L (ref 0.7–2)
LEUKOCYTE ESTERASE UR QL STRIP: NEGATIVE
LIPASE SERPL-CCNC: 36 U/L (ref 13–60)
LYMPHOCYTES # BLD AUTO: 1.9 10E3/UL (ref 0.8–5.3)
LYMPHOCYTES # BLD AUTO: 2.6 10E3/UL (ref 0.8–5.3)
LYMPHOCYTES NFR BLD AUTO: 21 %
LYMPHOCYTES NFR BLD AUTO: 25 %
MAGNESIUM SERPL-MCNC: 2.3 MG/DL (ref 1.7–2.3)
MCH RBC QN AUTO: 29.4 PG (ref 26.5–33)
MCH RBC QN AUTO: 30.2 PG (ref 26.5–33)
MCHC RBC AUTO-ENTMCNC: 32.7 G/DL (ref 31.5–36.5)
MCHC RBC AUTO-ENTMCNC: 34.2 G/DL (ref 31.5–36.5)
MCV RBC AUTO: 88 FL (ref 78–100)
MCV RBC AUTO: 90 FL (ref 78–100)
MONOCYTES # BLD AUTO: 0.5 10E3/UL (ref 0–1.3)
MONOCYTES # BLD AUTO: 0.8 10E3/UL (ref 0–1.3)
MONOCYTES NFR BLD AUTO: 7 %
MONOCYTES NFR BLD AUTO: 7 %
NEUTROPHILS # BLD AUTO: 5.1 10E3/UL (ref 1.6–8.3)
NEUTROPHILS # BLD AUTO: 8.6 10E3/UL (ref 1.6–8.3)
NEUTROPHILS NFR BLD AUTO: 66 %
NEUTROPHILS NFR BLD AUTO: 70 %
NITRATE UR QL: NEGATIVE
NRBC # BLD AUTO: 0 10E3/UL
NRBC # BLD AUTO: 0 10E3/UL
NRBC BLD AUTO-RTO: 0 /100
NRBC BLD AUTO-RTO: 0 /100
PH UR STRIP: 5.5 [PH] (ref 5–9)
PLATELET # BLD AUTO: 274 10E3/UL (ref 150–450)
PLATELET # BLD AUTO: 335 10E3/UL (ref 150–450)
POTASSIUM SERPL-SCNC: 4.2 MMOL/L (ref 3.4–5.3)
POTASSIUM SERPL-SCNC: 4.4 MMOL/L (ref 3.4–5.3)
PROT SERPL-MCNC: 6.8 G/DL (ref 6.4–8.3)
PROT SERPL-MCNC: 7.9 G/DL (ref 6.4–8.3)
RBC # BLD AUTO: 3.81 10E6/UL (ref 3.8–5.2)
RBC # BLD AUTO: 4.14 10E6/UL (ref 3.8–5.2)
SODIUM SERPL-SCNC: 140 MMOL/L (ref 135–145)
SODIUM SERPL-SCNC: 142 MMOL/L (ref 135–145)
SP GR UR STRIP: 1.01 (ref 1–1.03)
SPECIMEN EXPIRATION DATE: NORMAL
UROBILINOGEN UR STRIP-MCNC: NORMAL MG/DL
WBC # BLD AUTO: 12.3 10E3/UL (ref 4–11)
WBC # BLD AUTO: 7.7 10E3/UL (ref 4–11)

## 2024-05-27 PROCEDURE — 96361 HYDRATE IV INFUSION ADD-ON: CPT | Performed by: FAMILY MEDICINE

## 2024-05-27 PROCEDURE — 36415 COLL VENOUS BLD VENIPUNCTURE: CPT | Performed by: INTERNAL MEDICINE

## 2024-05-27 PROCEDURE — 258N000003 HC RX IP 258 OP 636: Performed by: INTERNAL MEDICINE

## 2024-05-27 PROCEDURE — 250N000011 HC RX IP 250 OP 636: Performed by: FAMILY MEDICINE

## 2024-05-27 PROCEDURE — 96375 TX/PRO/DX INJ NEW DRUG ADDON: CPT | Performed by: FAMILY MEDICINE

## 2024-05-27 PROCEDURE — 82040 ASSAY OF SERUM ALBUMIN: CPT | Performed by: FAMILY MEDICINE

## 2024-05-27 PROCEDURE — 99285 EMERGENCY DEPT VISIT HI MDM: CPT | Performed by: FAMILY MEDICINE

## 2024-05-27 PROCEDURE — 120N000001 HC R&B MED SURG/OB

## 2024-05-27 PROCEDURE — 250N000013 HC RX MED GY IP 250 OP 250 PS 637: Performed by: INTERNAL MEDICINE

## 2024-05-27 PROCEDURE — 84450 TRANSFERASE (AST) (SGOT): CPT | Performed by: INTERNAL MEDICINE

## 2024-05-27 PROCEDURE — 83690 ASSAY OF LIPASE: CPT | Performed by: INTERNAL MEDICINE

## 2024-05-27 PROCEDURE — 99285 EMERGENCY DEPT VISIT HI MDM: CPT | Mod: 25 | Performed by: FAMILY MEDICINE

## 2024-05-27 PROCEDURE — 258N000003 HC RX IP 258 OP 636: Performed by: FAMILY MEDICINE

## 2024-05-27 PROCEDURE — 85610 PROTHROMBIN TIME: CPT | Performed by: FAMILY MEDICINE

## 2024-05-27 PROCEDURE — 96376 TX/PRO/DX INJ SAME DRUG ADON: CPT | Performed by: FAMILY MEDICINE

## 2024-05-27 PROCEDURE — 86900 BLOOD TYPING SEROLOGIC ABO: CPT | Performed by: FAMILY MEDICINE

## 2024-05-27 PROCEDURE — 84075 ASSAY ALKALINE PHOSPHATASE: CPT | Performed by: INTERNAL MEDICINE

## 2024-05-27 PROCEDURE — 36415 COLL VENOUS BLD VENIPUNCTURE: CPT | Performed by: FAMILY MEDICINE

## 2024-05-27 PROCEDURE — 99207 PR NO CHARGE LOS: CPT | Performed by: INTERNAL MEDICINE

## 2024-05-27 PROCEDURE — 85025 COMPLETE CBC W/AUTO DIFF WBC: CPT | Performed by: FAMILY MEDICINE

## 2024-05-27 PROCEDURE — 74177 CT ABD & PELVIS W/CONTRAST: CPT | Mod: TC

## 2024-05-27 PROCEDURE — 250N000011 HC RX IP 250 OP 636: Performed by: INTERNAL MEDICINE

## 2024-05-27 PROCEDURE — 81003 URINALYSIS AUTO W/O SCOPE: CPT | Performed by: INTERNAL MEDICINE

## 2024-05-27 PROCEDURE — 83735 ASSAY OF MAGNESIUM: CPT | Performed by: INTERNAL MEDICINE

## 2024-05-27 PROCEDURE — 99223 1ST HOSP IP/OBS HIGH 75: CPT | Mod: 25 | Performed by: INTERNAL MEDICINE

## 2024-05-27 PROCEDURE — 83605 ASSAY OF LACTIC ACID: CPT | Performed by: INTERNAL MEDICINE

## 2024-05-27 PROCEDURE — 96365 THER/PROPH/DIAG IV INF INIT: CPT | Mod: XU | Performed by: FAMILY MEDICINE

## 2024-05-27 PROCEDURE — 85025 COMPLETE CBC W/AUTO DIFF WBC: CPT | Performed by: INTERNAL MEDICINE

## 2024-05-27 RX ORDER — PIPERACILLIN SODIUM, TAZOBACTAM SODIUM 3; .375 G/15ML; G/15ML
3.38 INJECTION, POWDER, LYOPHILIZED, FOR SOLUTION INTRAVENOUS EVERY 8 HOURS
Status: DISCONTINUED | OUTPATIENT
Start: 2024-05-27 | End: 2024-05-27

## 2024-05-27 RX ORDER — ACETAMINOPHEN 325 MG/1
650 TABLET ORAL EVERY 4 HOURS PRN
Status: DISCONTINUED | OUTPATIENT
Start: 2024-05-27 | End: 2024-05-30 | Stop reason: HOSPADM

## 2024-05-27 RX ORDER — HYDROMORPHONE HCL IN WATER/PF 6 MG/30 ML
0.2 PATIENT CONTROLLED ANALGESIA SYRINGE INTRAVENOUS
Status: COMPLETED | OUTPATIENT
Start: 2024-05-27 | End: 2024-05-27

## 2024-05-27 RX ORDER — ASCORBIC ACID 500 MG
500 TABLET ORAL 2 TIMES DAILY
Status: DISCONTINUED | OUTPATIENT
Start: 2024-05-27 | End: 2024-05-30 | Stop reason: HOSPADM

## 2024-05-27 RX ORDER — FEXOFENADINE HCL 180 MG/1
180 TABLET ORAL AT BEDTIME
COMMUNITY

## 2024-05-27 RX ORDER — OXYCODONE HYDROCHLORIDE 5 MG/1
5 TABLET ORAL EVERY 4 HOURS PRN
Status: DISCONTINUED | OUTPATIENT
Start: 2024-05-27 | End: 2024-05-30 | Stop reason: HOSPADM

## 2024-05-27 RX ORDER — ONDANSETRON 2 MG/ML
4 INJECTION INTRAMUSCULAR; INTRAVENOUS EVERY 6 HOURS PRN
Status: DISCONTINUED | OUTPATIENT
Start: 2024-05-27 | End: 2024-05-30 | Stop reason: HOSPADM

## 2024-05-27 RX ORDER — PIPERACILLIN SODIUM, TAZOBACTAM SODIUM 3; .375 G/15ML; G/15ML
3.38 INJECTION, POWDER, LYOPHILIZED, FOR SOLUTION INTRAVENOUS EVERY 6 HOURS
Status: DISCONTINUED | OUTPATIENT
Start: 2024-05-27 | End: 2024-05-30 | Stop reason: HOSPADM

## 2024-05-27 RX ORDER — AMLODIPINE BESYLATE 10 MG/1
10 TABLET ORAL DAILY
COMMUNITY

## 2024-05-27 RX ORDER — PIPERACILLIN SODIUM, TAZOBACTAM SODIUM 4; .5 G/20ML; G/20ML
4.5 INJECTION, POWDER, LYOPHILIZED, FOR SOLUTION INTRAVENOUS ONCE
Status: COMPLETED | OUTPATIENT
Start: 2024-05-27 | End: 2024-05-27

## 2024-05-27 RX ORDER — FLUOXETINE 10 MG/1
10 TABLET, FILM COATED ORAL DAILY
Status: DISCONTINUED | OUTPATIENT
Start: 2024-05-27 | End: 2024-05-27

## 2024-05-27 RX ORDER — UREA 10 %
1000 LOTION (ML) TOPICAL DAILY
Status: DISCONTINUED | OUTPATIENT
Start: 2024-05-27 | End: 2024-05-30 | Stop reason: HOSPADM

## 2024-05-27 RX ORDER — LOSARTAN POTASSIUM 100 MG/1
100 TABLET ORAL DAILY
COMMUNITY

## 2024-05-27 RX ORDER — MULTIVITAMIN,THERAPEUTIC
1 TABLET ORAL DAILY
Status: ON HOLD | COMMUNITY
End: 2024-05-27

## 2024-05-27 RX ORDER — CALCIUM CARBONATE 500 MG/1
1000 TABLET, CHEWABLE ORAL 4 TIMES DAILY PRN
Status: DISCONTINUED | OUTPATIENT
Start: 2024-05-27 | End: 2024-05-30 | Stop reason: HOSPADM

## 2024-05-27 RX ORDER — ROSUVASTATIN CALCIUM 5 MG/1
5 TABLET, COATED ORAL AT BEDTIME
Status: DISCONTINUED | OUTPATIENT
Start: 2024-05-27 | End: 2024-05-30 | Stop reason: HOSPADM

## 2024-05-27 RX ORDER — LOSARTAN POTASSIUM 50 MG/1
50 TABLET ORAL 2 TIMES DAILY
Status: DISCONTINUED | OUTPATIENT
Start: 2024-05-27 | End: 2024-05-27 | Stop reason: ALTCHOICE

## 2024-05-27 RX ORDER — AMLODIPINE BESYLATE 5 MG/1
5 TABLET ORAL DAILY
Status: DISCONTINUED | OUTPATIENT
Start: 2024-05-27 | End: 2024-05-30 | Stop reason: HOSPADM

## 2024-05-27 RX ORDER — IOPAMIDOL 755 MG/ML
104 INJECTION, SOLUTION INTRAVASCULAR ONCE
Status: COMPLETED | OUTPATIENT
Start: 2024-05-27 | End: 2024-05-27

## 2024-05-27 RX ORDER — DONEPEZIL HYDROCHLORIDE 5 MG/1
5 TABLET, FILM COATED ORAL AT BEDTIME
Status: DISCONTINUED | OUTPATIENT
Start: 2024-05-27 | End: 2024-05-27

## 2024-05-27 RX ORDER — SIMVASTATIN 10 MG
10 TABLET ORAL AT BEDTIME
COMMUNITY
Start: 2024-04-10

## 2024-05-27 RX ORDER — ONDANSETRON 4 MG/1
4 TABLET, ORALLY DISINTEGRATING ORAL EVERY 6 HOURS PRN
Status: DISCONTINUED | OUTPATIENT
Start: 2024-05-27 | End: 2024-05-30 | Stop reason: HOSPADM

## 2024-05-27 RX ORDER — SODIUM PHOSPHATE,MONO-DIBASIC 19G-7G/118
500 ENEMA (ML) RECTAL 2 TIMES DAILY
COMMUNITY

## 2024-05-27 RX ORDER — LOSARTAN POTASSIUM 50 MG/1
100 TABLET ORAL DAILY
Status: DISCONTINUED | OUTPATIENT
Start: 2024-05-27 | End: 2024-05-30 | Stop reason: HOSPADM

## 2024-05-27 RX ORDER — HYDROMORPHONE HYDROCHLORIDE 1 MG/ML
0.3 INJECTION, SOLUTION INTRAMUSCULAR; INTRAVENOUS; SUBCUTANEOUS
Status: DISCONTINUED | OUTPATIENT
Start: 2024-05-27 | End: 2024-05-30 | Stop reason: HOSPADM

## 2024-05-27 RX ORDER — SODIUM CHLORIDE 9 MG/ML
INJECTION, SOLUTION INTRAVENOUS CONTINUOUS
Status: DISCONTINUED | OUTPATIENT
Start: 2024-05-27 | End: 2024-05-30 | Stop reason: HOSPADM

## 2024-05-27 RX ORDER — GLIPIZIDE 10 MG/1
1 TABLET ORAL 2 TIMES DAILY
Status: DISCONTINUED | OUTPATIENT
Start: 2024-05-27 | End: 2024-05-30 | Stop reason: HOSPADM

## 2024-05-27 RX ORDER — AMOXICILLIN 250 MG
1 CAPSULE ORAL 2 TIMES DAILY PRN
Status: DISCONTINUED | OUTPATIENT
Start: 2024-05-27 | End: 2024-05-30 | Stop reason: HOSPADM

## 2024-05-27 RX ORDER — LANOLIN ALCOHOL/MO/W.PET/CERES
3 CREAM (GRAM) TOPICAL
Status: DISCONTINUED | OUTPATIENT
Start: 2024-05-27 | End: 2024-05-30 | Stop reason: HOSPADM

## 2024-05-27 RX ORDER — DONEPEZIL HYDROCHLORIDE 10 MG/1
10 TABLET, FILM COATED ORAL EVERY MORNING
COMMUNITY

## 2024-05-27 RX ORDER — DONEPEZIL HYDROCHLORIDE 5 MG/1
10 TABLET, FILM COATED ORAL AT BEDTIME
Status: DISCONTINUED | OUTPATIENT
Start: 2024-05-27 | End: 2024-05-30 | Stop reason: HOSPADM

## 2024-05-27 RX ORDER — LIDOCAINE 40 MG/G
CREAM TOPICAL
Status: DISCONTINUED | OUTPATIENT
Start: 2024-05-27 | End: 2024-05-30 | Stop reason: HOSPADM

## 2024-05-27 RX ORDER — BETAMETHASONE DIPROPIONATE 0.5 MG/G
CREAM TOPICAL DAILY PRN
COMMUNITY
Start: 2023-10-11

## 2024-05-27 RX ORDER — AMOXICILLIN 250 MG
2 CAPSULE ORAL 2 TIMES DAILY PRN
Status: DISCONTINUED | OUTPATIENT
Start: 2024-05-27 | End: 2024-05-30 | Stop reason: HOSPADM

## 2024-05-27 RX ORDER — CHLORAL HYDRATE 500 MG
2 CAPSULE ORAL DAILY
COMMUNITY

## 2024-05-27 RX ORDER — ONDANSETRON 2 MG/ML
4 INJECTION INTRAMUSCULAR; INTRAVENOUS EVERY 30 MIN PRN
Status: DISCONTINUED | OUTPATIENT
Start: 2024-05-27 | End: 2024-05-27

## 2024-05-27 RX ORDER — LORATADINE 10 MG/1
10 TABLET ORAL AT BEDTIME
Status: DISCONTINUED | OUTPATIENT
Start: 2024-05-27 | End: 2024-05-30 | Stop reason: HOSPADM

## 2024-05-27 RX ORDER — ACETAMINOPHEN 650 MG/1
650 SUPPOSITORY RECTAL EVERY 4 HOURS PRN
Status: DISCONTINUED | OUTPATIENT
Start: 2024-05-27 | End: 2024-05-30 | Stop reason: HOSPADM

## 2024-05-27 RX ORDER — HYDROMORPHONE HYDROCHLORIDE 1 MG/ML
0.5 INJECTION, SOLUTION INTRAMUSCULAR; INTRAVENOUS; SUBCUTANEOUS
Status: DISCONTINUED | OUTPATIENT
Start: 2024-05-27 | End: 2024-05-30 | Stop reason: HOSPADM

## 2024-05-27 RX ORDER — SACCHAROMYCES BOULARDII 250 MG
250 CAPSULE ORAL 2 TIMES DAILY
Status: DISCONTINUED | OUTPATIENT
Start: 2024-05-27 | End: 2024-05-30 | Stop reason: HOSPADM

## 2024-05-27 RX ADMIN — Medication 250 MG: at 09:42

## 2024-05-27 RX ADMIN — Medication 250 MG: at 20:17

## 2024-05-27 RX ADMIN — DONEPEZIL HYDROCHLORIDE 10 MG: 5 TABLET, FILM COATED ORAL at 20:20

## 2024-05-27 RX ADMIN — LOSARTAN POTASSIUM 100 MG: 50 TABLET, FILM COATED ORAL at 12:27

## 2024-05-27 RX ADMIN — SODIUM CHLORIDE: 9 INJECTION, SOLUTION INTRAVENOUS at 15:49

## 2024-05-27 RX ADMIN — HYDROMORPHONE HYDROCHLORIDE 0.2 MG: 0.2 INJECTION, SOLUTION INTRAMUSCULAR; INTRAVENOUS; SUBCUTANEOUS at 02:57

## 2024-05-27 RX ADMIN — IOPAMIDOL 104 ML: 755 INJECTION, SOLUTION INTRAVENOUS at 03:13

## 2024-05-27 RX ADMIN — ROSUVASTATIN CALCIUM 5 MG: 5 TABLET, FILM COATED ORAL at 20:20

## 2024-05-27 RX ADMIN — PIPERACILLIN AND TAZOBACTAM 3.38 G: 3; .375 INJECTION, POWDER, LYOPHILIZED, FOR SOLUTION INTRAVENOUS at 23:35

## 2024-05-27 RX ADMIN — HYDROMORPHONE HYDROCHLORIDE 0.3 MG: 1 INJECTION, SOLUTION INTRAMUSCULAR; INTRAVENOUS; SUBCUTANEOUS at 20:16

## 2024-05-27 RX ADMIN — DEXTRAN 70, GLYCERIN, HYPROMELLOSE 1 DROP: 1; 2; 3 SOLUTION/ DROPS OPHTHALMIC at 08:14

## 2024-05-27 RX ADMIN — FAMOTIDINE 20 MG: 10 INJECTION, SOLUTION INTRAVENOUS at 13:08

## 2024-05-27 RX ADMIN — AMLODIPINE BESYLATE 5 MG: 5 TABLET ORAL at 20:19

## 2024-05-27 RX ADMIN — SODIUM CHLORIDE 1000 ML: 9 INJECTION, SOLUTION INTRAVENOUS at 02:23

## 2024-05-27 RX ADMIN — HYDROMORPHONE HYDROCHLORIDE 0.3 MG: 1 INJECTION, SOLUTION INTRAMUSCULAR; INTRAVENOUS; SUBCUTANEOUS at 13:10

## 2024-05-27 RX ADMIN — SODIUM CHLORIDE: 9 INJECTION, SOLUTION INTRAVENOUS at 06:37

## 2024-05-27 RX ADMIN — CALCIUM CARBONATE (ANTACID) CHEW TAB 500 MG 1000 MG: 500 CHEW TAB at 15:48

## 2024-05-27 RX ADMIN — HYDROMORPHONE HYDROCHLORIDE 0.3 MG: 1 INJECTION, SOLUTION INTRAMUSCULAR; INTRAVENOUS; SUBCUTANEOUS at 08:14

## 2024-05-27 RX ADMIN — ONDANSETRON 4 MG: 2 INJECTION INTRAMUSCULAR; INTRAVENOUS at 02:29

## 2024-05-27 RX ADMIN — ACETAMINOPHEN 650 MG: 325 TABLET, FILM COATED ORAL at 15:48

## 2024-05-27 RX ADMIN — PIPERACILLIN AND TAZOBACTAM 4.5 G: 4; .5 INJECTION, POWDER, LYOPHILIZED, FOR SOLUTION INTRAVENOUS at 05:21

## 2024-05-27 RX ADMIN — HYDROMORPHONE HYDROCHLORIDE 0.2 MG: 0.2 INJECTION, SOLUTION INTRAMUSCULAR; INTRAVENOUS; SUBCUTANEOUS at 02:33

## 2024-05-27 RX ADMIN — OXYCODONE HYDROCHLORIDE AND ACETAMINOPHEN 500 MG: 500 TABLET ORAL at 17:51

## 2024-05-27 RX ADMIN — ACETAMINOPHEN 650 MG: 325 TABLET, FILM COATED ORAL at 22:08

## 2024-05-27 RX ADMIN — PIPERACILLIN AND TAZOBACTAM 3.38 G: 3; .375 INJECTION, POWDER, LYOPHILIZED, FOR SOLUTION INTRAVENOUS at 17:51

## 2024-05-27 RX ADMIN — PIPERACILLIN AND TAZOBACTAM 3.38 G: 3; .375 INJECTION, POWDER, LYOPHILIZED, FOR SOLUTION INTRAVENOUS at 12:27

## 2024-05-27 RX ADMIN — HYDROMORPHONE HYDROCHLORIDE 0.2 MG: 0.2 INJECTION, SOLUTION INTRAMUSCULAR; INTRAVENOUS; SUBCUTANEOUS at 05:44

## 2024-05-27 RX ADMIN — LORATADINE 10 MG: 10 TABLET ORAL at 20:19

## 2024-05-27 RX ADMIN — FAMOTIDINE 20 MG: 10 INJECTION, SOLUTION INTRAVENOUS at 02:31

## 2024-05-27 RX ADMIN — OXYCODONE HYDROCHLORIDE AND ACETAMINOPHEN 500 MG: 500 TABLET ORAL at 08:14

## 2024-05-27 ASSESSMENT — ACTIVITIES OF DAILY LIVING (ADL)
ADLS_ACUITY_SCORE: 20
ADLS_ACUITY_SCORE: 20
ADLS_ACUITY_SCORE: 35
ADLS_ACUITY_SCORE: 23
ADLS_ACUITY_SCORE: 22
ADLS_ACUITY_SCORE: 23
ADLS_ACUITY_SCORE: 35
ADLS_ACUITY_SCORE: 23
ADLS_ACUITY_SCORE: 23
ADLS_ACUITY_SCORE: 35
ADLS_ACUITY_SCORE: 35
ADLS_ACUITY_SCORE: 23
ADLS_ACUITY_SCORE: 20
ADLS_ACUITY_SCORE: 23
ADLS_ACUITY_SCORE: 33
ADLS_ACUITY_SCORE: 23

## 2024-05-27 ASSESSMENT — ENCOUNTER SYMPTOMS
ABDOMINAL PAIN: 1
DYSURIA: 0
CHILLS: 1
FEVER: 1
ABDOMINAL DISTENTION: 0

## 2024-05-27 ASSESSMENT — COLUMBIA-SUICIDE SEVERITY RATING SCALE - C-SSRS
2. HAVE YOU ACTUALLY HAD ANY THOUGHTS OF KILLING YOURSELF IN THE PAST MONTH?: NO
1. IN THE PAST MONTH, HAVE YOU WISHED YOU WERE DEAD OR WISHED YOU COULD GO TO SLEEP AND NOT WAKE UP?: NO
6. HAVE YOU EVER DONE ANYTHING, STARTED TO DO ANYTHING, OR PREPARED TO DO ANYTHING TO END YOUR LIFE?: NO

## 2024-05-27 NOTE — ED TRIAGE NOTES
"Pt comes in to the emergency department with complaints of rectal bleeding, diarrhea and nausea. She states she has had diarrhea since 1600 on 5-26-24, and developed bloody stools at 2130 tonight. States she has had about 4 of these. Has 8/10 LLQ abdominal pain.     /72   Pulse 77   Temp 97.6  F (36.4  C) (Tympanic)   Resp 20   Ht 1.727 m (5' 8\")   Wt 81.6 kg (180 lb)   SpO2 99%   BMI 27.37 kg/m         Triage Assessment (Adult)       Row Name 05/27/24 0159          Triage Assessment    Airway WDL WDL        Respiratory WDL    Respiratory WDL WDL        Skin Circulation/Temperature WDL    Skin Circulation/Temperature WDL WDL        Cardiac WDL    Cardiac WDL WDL        Peripheral/Neurovascular WDL    Peripheral Neurovascular WDL WDL        Cognitive/Neuro/Behavioral WDL    Cognitive/Neuro/Behavioral WDL WDL                     "

## 2024-05-27 NOTE — PROVIDER NOTIFICATION
05/27/24 0614   Valuables   Patient Belongings remains with patient   Patient Belongings Remaining with Patient clothing;cell phone/electronics;purse/wallet;vision aids   Did you bring any home meds/supplements to the hospital?  No       [facility] will make every effort per our policy to help keep your items safe while in the hospital.  If you choose to keep any items at the bedside, we cannot be held responsible for any items that are lost or broken.      List items sent to safe:     I have reviewed my belongings list on admission and verify that it is correct.     Patient signature_______________________________  Date/Time_____________________    2nd Staff person if patient unable to sign __________________________  Date/Time ______________________      I have received all my belongings noted above at discharge.    Patient signature________________________________  Date/Time  __________________________

## 2024-05-27 NOTE — PHARMACY-ADMISSION MEDICATION HISTORY
Pharmacist Admission Medication History    Admission medication history is complete. The information provided in this note is only as accurate as the sources available at the time of the update.    Information Source(s): Patient, Facility (U/NH/) medication list/MAR, and CareEverywhere/SureScripts via in-person    Pertinent Information: no pertinent is formation at this time. Patient takes care of her own medication.    Changes made to PTA medication list:  Added:   Simvastatin  Glucosamine  Deleted:   Amitriptyline  Vitamin B-12  Fluoxetine  Lactobacillus  Rosuvastatin  Tacrolimus cream  Changed:   Amlodipine  Dose increase to 10 mg  Metamucil  BID to TID  Cetirizine  Patient switch to Allegra  Cranberry   Patient is taking 2 tablets of Azo Cranberry  Bone Maximer  TID to BID      Allergies reviewed with patient and updates made in EHR: yes    Medication History Completed By: Harlan Mustafa [facility] 5/27/2024 4:14 PM    PTA Med List   Medication Sig Last Dose    amLODIPine (NORVASC) 10 MG tablet Take 10 mg by mouth daily 5/26/2024 at am    Ascorbic Acid (VITAMIN C PO) Take 500 mg by mouth 2 times daily 5/26/2024 at pm    aspirin 81 MG tablet Take 1 tablet by mouth daily. 5/26/2024 at pm    betamethasone dipropionate (DIPROSONE) 0.05 % external cream Apply topically daily as needed (rash) Unknown    Biotin-Vitamin C (HAIR SKIN NAILS GUMMIES PO) Take 2 Pieces by mouth every morning 5/26/2024 at am    carboxymethylcellulose (CARBOXYMETHYLCELLULOSE SODIUM) 0.5 % SOLN ophthalmic solution Apply 1 drop to eye 2 times daily 5/27/2024 at am    Collagen-Vitamin C (GNP COLLAGEN PLUS VITAMIN C PO) Take 1 capsule by mouth every morning 5/26/2024 at AM    Cranberry-Vitamin C-Probiotic (AZO CRANBERRY PO) Take 2 capsules by mouth every morning 5/26/2024 at AM    donepezil (ARICEPT) 10 MG tablet Take 10 mg by mouth every morning 5/26/2024 at AM    fexofenadine (ALLEGRA) 180 MG tablet Take 180 mg by mouth at bedtime  5/26/2024 at PM    fish oil-omega-3 fatty acids 1000 MG capsule Take 2 g by mouth daily 5/26/2024 at am    GARLIC PO Take 1,000 mg by mouth daily 5/26/2024 at AM    glucosamine 500 MG CAPS capsule Take 500 mg by mouth 2 times daily 5/26/2024 at AM    losartan (COZAAR) 100 MG tablet Take 100 mg by mouth daily 5/26/2024 at pm    Multiple Vitamins-Minerals (MULTIVITAMIN GUMMIES ADULT PO) Take 2 Pieces by mouth daily 5/26/2024 at am    psyllium (METAMUCIL/KONSYL) Packet Take 1 packet by mouth 3 times daily 5/26/2024 at AM    simvastatin (ZOCOR) 10 MG tablet Take 10 mg by mouth at bedtime 5/26/2024 at pm    Turmeric Curcumin 500 MG CAPS Take 1 capsule by mouth daily 5/26/2024 at AM    UNABLE TO FIND Take by mouth 2 times daily MEDICATION NAME: Bone maximizer 3 5/26/2024 at PM

## 2024-05-27 NOTE — ED PROVIDER NOTES
History     Chief Complaint   Patient presents with    Rectal Bleeding    Diarrhea    Nausea     HPI  Sammie Heaton is a 74 year old female with history of hypertension hyperlipidemia.   , venous stasis who presents emergency department with bright red bleeding every time she goes to the bathroom.  Does not feel like she is bleeding much in between bowel movements.  Patient mainly came in for severe left lower quadrant abdominal pain.  Very nauseated.  No fevers.  No similar episode like this in the past.  No recent antibiotics.    Reviewed nurses notes below, similar history is related to me.  Pt comes in to the emergency department with complaints of rectal bleeding, diarrhea and nausea. She states she has had diarrhea since 1600 on 5-26-24, and developed bloody stools at 2130 tonight. States she has had about 4 of these. Has 8/10 LLQ abdominal pain.   Allergies:  Allergies   Allergen Reactions    Codeine      constipation    Sulfa Antibiotics Other (See Comments)     Flush; sulfonamide antibiotics       Problem List:    Patient Active Problem List    Diagnosis Date Noted    After-cataract with vision obscured 07/14/2020     Priority: Medium    Essential hypertension 06/11/2020     Priority: Medium    Open wound of lower leg 06/11/2020     Priority: Medium    Malignant neoplasm of female breast (H) 05/21/2019     Priority: Medium     Formatting of this note might be different from the original.  Added automatically from request for surgery [id number]      Abnormal mammogram 05/08/2019     Priority: Medium    Arthritis of hand 05/08/2019     Priority: Medium    Headache 05/08/2019     Priority: Medium    Positive test for genetic marker of susceptibility to malignant neoplasm of breast 05/06/2019     Priority: Medium    Peripheral venous insufficiency 04/26/2017     Priority: Medium    General medical exam 04/26/2017     Priority: Medium    Venous stasis ulcers of both lower extremities (H) 06/03/2016      Priority: Medium    ACP (advance care planning) 05/25/2016     Priority: Medium     Advance Care Planning 5/25/2016: ACP Review of Chart / Resources Provided:  Reviewed chart for advance care plan.  Sammie Heaton has no plan or code status on file however states presence of ACP document. Copy requested. Confirmed code status reflects current choices pending receipt of document/advance care plan review.  Confirmed/documented legally designated decision makers.  Added by Cleo Yusuf      Advance Care Planning 6/9/2017: ACP Review of Chart / Resources Provided:  Reviewed chart for advance care plan.  Sammie Heaton has no plan or code status on file however states presence of ACP document. Copy requested.   Added by Luz Ford            Disorder of respiratory system exacerbated by aspirin 05/09/2016     Priority: Medium     Replacing diagnoses that were inactivated after the 10/1/2021 regulatory import.      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     Priority: Medium    Amphetamine user 05/17/2011     Priority: Medium    Idiopathic hypersomnia without long sleep time 05/17/2011     Priority: Medium    Osteopenia 04/18/2011     Priority: Medium    Basal cell papilloma 09/15/2009     Priority: Medium    Hyperlipidemia 09/15/2009     Priority: Medium        Past Medical History:    Past Medical History:   Diagnosis Date    Abdominal pain, unspecified site 09/19/2001    Cystitis, unspecified 05/12/2003    Dermatophytosis of the body 11/07/2006    Follow-up examination, following unspecified surgery 03/26/2003    Hemorrhage of rectum and anus 10/20/2005    Nonspecific abnormal findings on radiological and 06/27/2000    Osteoporosis, unspecified 07/26/2000    Other abnormal findings on radiological examinatio 03/18/2002    Other screening mammogram 09/11/2001    Routine general medical examination at a health care facility 06/14/2000    Sebaceous cyst 03/10/2003    Unspecified ulcer of lower limb 11/06/2003     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) 2003    Venous (peripheral) insufficiency, unspecified 2004       Past Surgical History:    Past Surgical History:   Procedure Laterality Date    BLADDER SURGERY  2017    bladder repair done at [place]    COLONOSCOPY  2005    ENT SURGERY      tonsillectomy    GYN SURGERY      hysterectomy    GYN SURGERY      right ovary removed at [place]    PHACOEMULSIFICATION WITH STANDARD INTRAOCULAR LENS IMPLANT Left 9/10/2015    Procedure: PHACOEMULSIFICATION WITH STANDARD INTRAOCULAR LENS IMPLANT;  Surgeon: Ray Lucero MD;  Location: [facility]    PHACOEMULSIFICATION WITH STANDARD INTRAOCULAR LENS IMPLANT Right 2015    Procedure: PHACOEMULSIFICATION WITH STANDARD INTRAOCULAR LENS IMPLANT;  Surgeon: Ray Lucero MD;  Location: [facility] OR       Family History:    Family History   Problem Relation Age of Onset    Heart Disease Mother     C.A.D. Mother     Heart Disease Father         MI    Blood Disease Father     Breast Cancer Sister        Social History:  Marital Status:   [2]  Social History     Tobacco Use    Smoking status: Former     Current packs/day: 0.00     Average packs/day: 2.0 packs/day for 17.4 years (34.9 ttl pk-yrs)     Types: Cigarettes     Start date: 1966     Quit date: 1983     Years since quittin.9    Smokeless tobacco: Never   Substance Use Topics    Alcohol use: No    Drug use: No        Medications:    amitriptyline (ELAVIL) 25 MG tablet  amLODIPine (NORVASC) 5 MG tablet  Ascorbic Acid (VITAMIN C PO)  aspirin 81 MG tablet  Benzocaine-Menthol (DERMOPLAST EX)  bulk laxative (BENEFIBER DRINK MIX) packet  carboxymethylcellulose (CARBOXYMETHYLCELLULOSE SODIUM) 0.5 % SOLN ophthalmic solution  cetirizine (ZYRTEC) 10 MG tablet  coenzyme Q-10 100 MG TABS  Cranberry (RA CRANBERRY) 500 MG CAPS  cyanocobalamin (VITAMIN B-12) 1000 MCG tablet  diclofenac (VOLTAREN) 1 % topical gel  donepezil (ARICEPT) 5 MG  "tablet  FLUoxetine (PROZAC) 10 MG tablet  GARLIC PO  KRILL OIL OMEGA-3 PO  Lactobacillus (ACIDOPHILUS PROBIOTIC PO)  losartan (COZAAR) 50 MG tablet  Multiple Vitamins-Minerals (ONE-A-DAY PROACTIVE 65+ PO)  rosuvastatin (CRESTOR) 5 MG tablet  tacrolimus (PROTOPIC) 0.1 % external ointment  Turmeric Curcumin 500 MG CAPS  UNABLE TO FIND  UNABLE TO FIND          Review of Systems   Constitutional:  Positive for chills and fever.   HENT:  Negative for congestion.    Cardiovascular:  Negative for chest pain.   Gastrointestinal:  Positive for abdominal pain. Negative for abdominal distention.   Genitourinary:  Negative for dysuria.       Physical Exam   BP: 139/72  Pulse: 77  Temp: 97.6  F (36.4  C)  Resp: 20  Height: 172.7 cm (5' 8\")  Weight: 81.6 kg (180 lb)  SpO2: 99 %      Physical Exam  Vitals and nursing note reviewed. Exam conducted with a chaperone present.   Cardiovascular:      Rate and Rhythm: Normal rate and regular rhythm.   Pulmonary:      Effort: Pulmonary effort is normal.      Breath sounds: Normal breath sounds.   Abdominal:      Tenderness: There is abdominal tenderness in the left lower quadrant. There is no left CVA tenderness or guarding. Negative signs include Webber's sign and McBurney's sign.   Genitourinary:     Rectum: Guaiac result positive. No tenderness.   Musculoskeletal:      Cervical back: Normal range of motion.   Neurological:      Mental Status: She is alert.         Results for orders placed or performed during the hospital encounter of 05/27/24 (from the past 24 hour(s))   CBC with platelets differential    Narrative    The following orders were created for panel order CBC with platelets differential.  Procedure                               Abnormality         Status                     ---------                               -----------         ------                     CBC with platelets and d...[[id number]]  Abnormal            Final result                 Please view results for " these tests on the individual orders.   Comprehensive metabolic panel   Result Value Ref Range    Sodium 140 135 - 145 mmol/L    Potassium 4.2 3.4 - 5.3 mmol/L    Carbon Dioxide (CO2) 21 (L) 22 - 29 mmol/L    Anion Gap 14 7 - 15 mmol/L    Urea Nitrogen 19.7 8.0 - 23.0 mg/dL    Creatinine 0.55 0.51 - 0.95 mg/dL    GFR Estimate >90 >60 mL/min/1.73m2    Calcium 10.5 (H) 8.8 - 10.2 mg/dL    Chloride 105 98 - 107 mmol/L    Glucose 114 (H) 70 - 99 mg/dL    Alkaline Phosphatase 88 40 - 150 U/L    AST 22 0 - 45 U/L    ALT 31 0 - 50 U/L    Protein Total 7.9 6.4 - 8.3 g/dL    Albumin 5.1 3.5 - 5.2 g/dL    Bilirubin Total 0.3 <=1.2 mg/dL   INR   Result Value Ref Range    INR 0.89 0.85 - 1.15   ABO/Rh type and screen    Narrative    The following orders were created for panel order ABO/Rh type and screen.  Procedure                               Abnormality         Status                     ---------                               -----------         ------                     Adult Type and Screen[[id number]]                            Edited Result - FINAL        Please view results for these tests on the individual orders.   Extra Tube ([place] Draw)    Narrative    The following orders were created for panel order Extra Tube ([place] Draw).  Procedure                               Abnormality         Status                     ---------                               -----------         ------                     Extra Red Top Tube[[id number]]                               Final result               Extra Green Top (Lithium...[[id number]]                      Final result                 Please view results for these tests on the individual orders.   CBC with platelets and differential   Result Value Ref Range    WBC Count 12.3 (H) 4.0 - 11.0 10e3/uL    RBC Count 4.14 3.80 - 5.20 10e6/uL    Hemoglobin 12.5 11.7 - 15.7 g/dL    Hematocrit 36.6 35.0 - 47.0 %    MCV 88 78 - 100 fL    MCH 30.2 26.5 - 33.0 pg    MCHC 34.2 31.5 - 36.5  g/dL    RDW 12.3 10.0 - 15.0 %    Platelet Count 335 150 - 450 10e3/uL    % Neutrophils 70 %    % Lymphocytes 21 %    % Monocytes 7 %    % Eosinophils 2 %    % Basophils 0 %    % Immature Granulocytes 0 %    NRBCs per 100 WBC 0 <1 /100    Absolute Neutrophils 8.6 (H) 1.6 - 8.3 10e3/uL    Absolute Lymphocytes 2.6 0.8 - 5.3 10e3/uL    Absolute Monocytes 0.8 0.0 - 1.3 10e3/uL    Absolute Eosinophils 0.2 0.0 - 0.7 10e3/uL    Absolute Basophils 0.1 0.0 - 0.2 10e3/uL    Absolute Immature Granulocytes 0.0 <=0.4 10e3/uL    Absolute NRBCs 0.0 10e3/uL   Adult Type and Screen   Result Value Ref Range    ABO/RH(D) AB POS     Antibody Screen Negative Negative    SPECIMEN EXPIRATION DATE [id number]    Extra Red Top Tube   Result Value Ref Range    Hold Specimen JIC    Extra Green Top (Lithium Heparin) Tube   Result Value Ref Range    Hold Specimen JIC    CT Abdomen Pelvis w Contrast    Narrative    PROCEDURE INFORMATION:   Exam: CT Abdomen And Pelvis With Contrast   Exam date and time: 5/27/2024 3:13 AM   Age: 74 years old   Clinical indication: Nausea and other: Diarrhea; Additional info: Rectal   bleeding and abdominal pain     TECHNIQUE:   Imaging protocol: Computed tomography of the abdomen and pelvis with contrast.   Radiation optimization: All CT scans at this facility use at least one of these   dose optimization techniques: automated exposure control; mA and/or kV   adjustment per patient size (includes targeted exams where dose is matched to   clinical indication); or iterative reconstruction.   Contrast material: ISOVUE-370; Contrast volume: 104 ml; Contrast route:   INTRAVENOUS (IV);      COMPARISON:   No relevant prior studies available.     FINDINGS:   Tubes, catheters and devices: Unremarkable.      Diaphragm: Moderately elevated diaphragms bilaterally.     Liver: Moderate enlargement of the left hepatic lobe and hepatomegaly.   Correlate with LFTs.   Gallbladder and bile ducts: Multiple calcified gallstones are  present.   Pancreas: Moderate multifocal cystic changes of the head of the pancreas   etiology indeterminate.   Spleen: Unremarkable. No splenomegaly.   Adrenal glands: Normal. No mass.   Kidneys and ureters: Moderate urinary retention.   Stomach and bowel: There is moderate thickening of the rectosigmoid colon there   is advanced left colitis from the splenic flexure onwards.   Appendix: No evidence of appendicitis.     Intraperitoneal space: Unremarkable. No free air. No significant fluid   collection.   Vasculature: The aorta demonstrates moderate atherosclerotic calcification.   Descend indwelling left iliac external iliac vein and common femoral vein   stent. Mild stenosis origin of the celiac trunk.   Lymph nodes: Small periaortic lymph nodes.   Urinary bladder: Unremarkable as visualized.   Reproductive: Unremarkable as visualized.   Bones/joints: Unremarkable. No acute fracture.   Soft tissues: Unremarkable.       Impression    IMPRESSION:   1.   Severe colitis left colon and rectosigmoid colon possible infectious   inflammatory colitis or other source.   2.   Moderate urinary retention correlate with urinalysis.   3.   Heterogeneous and atrophy or microcystic changes noted of the head of the   pancreas correlate with serum lipase level and or MRCP. Consider GI   consultation follow-up.   4.   Extensive cholelithiasis. No acute cholecystitis.   5.   5 per incidental iliac venous stent appears patent.     THIS DOCUMENT HAS BEEN ELECTRONICALLY SIGNED BY BOZENA IBARRA MD   Extra Tube ([place] Draw)    Narrative    The following orders were created for panel order Extra Tube ([place] Draw).  Procedure                               Abnormality         Status                     ---------                               -----------         ------                     Extra Urine Collection[[id number]]                           Final result                 Please view results for these tests on the individual orders.    Extra Urine Collection   Result Value Ref Range    Hold Specimen JI        Medications   ondansetron (ZOFRAN) injection 4 mg (4 mg Intravenous $Given 5/27/24 0229)   famotidine (PEPCID) injection 20 mg (20 mg Intravenous $Given 5/27/24 0231)   HYDROmorphone (DILAUDID) injection 0.2 mg (0.2 mg Intravenous $Given 5/27/24 0257)   piperacillin-tazobactam (ZOSYN) 4.5 g vial to attach to  mL bag (has no administration in time range)   sodium chloride 0.9% BOLUS 1,000 mL (0 mLs Intravenous Stopped 5/27/24 8653)   iopamidol (ISOVUE-370) solution 104 mL (104 mLs Intravenous $Given 5/27/24 0313)       Assessments & Plan (with Medical Decision Making)     I have reviewed the nursing notes.    I have reviewed the findings, diagnosis, plan and need for follow up with the patient.    Medical Decision Making  The patient's presentation was of moderate complexity (an acute illness with systemic symptoms).    The patient's evaluation involved:  ordering and/or review of 3+ test(s) in this encounter (see separate area of note for details)  discussion of management or test interpretation with another health professional (see separate area of note for details)    The patient's management necessitated high risk (a parenteral controlled substance).    5:10 AM--I just checked on patient, she is still in 2-3 out of 10 pain, she feels much better though then she did when she presented.  She states she does not need anything further for pain control right at this moment.  Nausea is better as well.  Discussed CT finding.  Discussed admission for pain control, antibiotics and surgical consultation.  Patient is in agreement.  House supervisor aware.    5:11 AM--I have Vocshannan page into Dr. Childs.    5:26 AM--- I spoke with Dr. Childs who graciously accepted the patient for admission.  He will see the patient here in the emergency department via telemedicine.  Patient and family aware    New Prescriptions    No medications on file        Final diagnoses:   Acute colitis       5/27/2024   [facility] AND [facility]       Johnathon Menjivar MD  05/27/24 0341

## 2024-05-27 NOTE — PLAN OF CARE
"Afebrile,vss. Abdomen rounded/distended.Tenderness upon palpation across lower abdomen,small/smear dark red, mucoid stool, not enough for sample this shift. Pain rated 6-8/10, prn tylenol and dilaudid given,see mar. Pt tolerating clear liquids, reports heartburn,tums given.  Pt SBA with ambulation, family remains at bedside.    /50   Pulse 69   Temp 99  F (37.2  C) (Tympanic)   Resp 18   Ht 1.727 m (5' 8\")   Wt 81.6 kg (180 lb)   SpO2 99%   BMI 27.37 kg/m        Goal Outcome Evaluation:      Plan of Care Reviewed With: patient, spouse, family    Overall Patient Progress: no change      Problem: Adult Inpatient Plan of Care  Goal: Patient-Specific Goal (Individualized)  5/27/2024 1237 by Randee Gaming RN  Outcome: Progressing  Flowsheets (Taken 5/27/2024 1237)  Individualized Care Needs: abdominal pain  Anxieties, Fears or Concerns: denies  Patient/Family-Specific Goals (Include Timeframe): treat pain as needed.     Problem: Adult Inpatient Plan of Care  Goal: Absence of Hospital-Acquired Illness or Injury  Intervention: Identify and Manage Fall Risk  Recent Flowsheet Documentation  Taken 5/27/2024 1551 by Randee Gaming, RN  Safety Promotion/Fall Prevention:   activity supervised   safety round/check completed   room door open   patient and family education   nonskid shoes/slippers when out of bed   clutter free environment maintained    Problem: Adult Inpatient Plan of Care  Goal: Absence of Hospital-Acquired Illness or Injury  Intervention: Prevent Skin Injury  Recent Flowsheet Documentation  Taken 5/27/2024 1551 by Randee Gaming, RN  Body Position: position changed independently  Skin Protection: adhesive use limited  Device Skin Pressure Protection: adhesive use limited      Problem: Adult Inpatient Plan of Care  Goal: Absence of Hospital-Acquired Illness or Injury  Intervention: Prevent and Manage VTE (Venous Thromboembolism) Risk  Recent Flowsheet Documentation  Taken 5/27/2024 1551 by " Randee Gaming, RN  VTE Prevention/Management: (no order) other (see comments)    Problem: Risk for Delirium  Goal: Improved Behavioral Control  Intervention: Minimize Safety Risk  Recent Flowsheet Documentation  Taken 5/27/2024 1551 by Randee Gaming RN  Communication Enhancement Strategies:   call light answered in person   family/caregiver assisted with communication  Enhanced Safety Measures: review medications for side effects with activity    Problem: Bowel Disease, Inflammatory (Ulcerative Colitis or Crohn's Disease)  Goal: Absence of Infection Signs and Symptoms  Intervention: Prevent or Manage Infection  Recent Flowsheet Documentation  Taken 5/27/2024 1551 by Randee Gaming, RN  Isolation Precautions: enteric precautions maintained  Taken 5/27/2024 0939 by Randee Gaming, RN  Isolation Precautions: enteric precautions initiated

## 2024-05-27 NOTE — PROGRESS NOTES
Chart accessed for transfer to [facility].  Demetria Butt RN............................ 5/27/2024 5:43 AM    Report received from YOANNA Lloyd prior to transfer.  Demetria Butt RN............................ 5/27/2024 5:50 AM

## 2024-05-27 NOTE — PROGRESS NOTES
Interdisciplinary Discharge Planning Note    Anticipated Discharge Date:5/29-5/30    Anticipated Discharge Location: Home    Clinical Needs Before Discharge:   Bowel rest, stool sample, IV fluids    Treatment Needs After Discharge:  None identified    Potential Barriers to Discharge: None Identified    ANSELMO Ruano  5/27/2024,  12:58 PM

## 2024-05-27 NOTE — ED NOTES
Patient's abdomen is tender to palpation.  She states that her pain is worse when she lays down, she is onto her right side curled in the fetal position.  States that she didn't start having diarrhea right away, earlier her bowels were formed and normal.  She sees blood after going to the bathroom on the toilet paper when she wipes.  Complaints of chronic neck and back pain.  MD in room assessing patient.

## 2024-05-27 NOTE — H&P
HOSPITALIST TELEMED ADMISSION H&P    Service Date : 5/27/2024  Dr. Amadeo JONES, am located in [place].   Sammie Heaton is located in [place] at [facility].   The RN or tech on duty in the ED is assisting me today with this patient.    chief complaint:abd pain    History of Present Illness:  Sammie Heaton is a 74 year old female with history of hypertension, hyperlipidemia, mild cognitive impairment who is presenting to ED today with complaints of abdominal pain.  Patient reports her symptoms started today.  She has severe pain that started around 4 PM today with the pain being located in the left lower quadrant of her abdomen.  She reports she is been nauseated.  She started to have loose stools and eventually started having rectal bleeding.  She was given multiple rounds of pain meds in the ER and her pain has improved but still present.  The pain was constant earlier today.  She denies any fevers.  She has some subjective chills.  No chest pain or difficulty breathing.  She reports she was diagnosed with colitis 12 or 13 years ago but did not have to be admitted to the hospital.  She had a colonoscopy in her 50s which was normal so she has been doing the Cologuard screening only.    Patient does have a living will.  She is a DNR and her daughter is her surrogate decision-maker    Emergency Room Course -see ED notes    Past Medical History  Past Medical History:   Diagnosis Date    Abdominal pain, unspecified site 09/19/2001    Cystitis, unspecified 05/12/2003    Dermatophytosis of the body 11/07/2006    Follow-up examination, following unspecified surgery 03/26/2003    Hemorrhage of rectum and anus 10/20/2005    Nonspecific abnormal findings on radiological and 06/27/2000    Osteoporosis, unspecified 07/26/2000    Other abnormal findings on radiological examinatio 03/18/2002    Other screening mammogram 09/11/2001    Routine general medical examination at a health care facility 06/14/2000    Sebaceous  cyst 03/10/2003    Unspecified ulcer of lower limb 11/06/2003    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) 12/03/2003    Venous (peripheral) insufficiency, unspecified 05/27/2004      Patient Active Problem List   Diagnosis    ACP (advance care planning)    Venous stasis ulcers of both lower extremities (H)    Abnormal mammogram    After-cataract with vision obscured    Amphetamine user    Arthritis of hand    Basal cell papilloma    Disorder of respiratory system exacerbated by aspirin    Essential hypertension    Headache    Hyperlipidemia    Idiopathic hypersomnia without long sleep time    Livedoid vasculopathy    Malignant neoplasm of female breast (H)    Open wound of lower leg    Osteopenia    Peripheral venous insufficiency    Positive test for genetic marker of susceptibility to malignant neoplasm of breast    General medical exam    Acute colitis         Past Surgical History  Past Surgical History:   Procedure Laterality Date    BLADDER SURGERY  07/31/2017    bladder repair done at [facility]    COLONOSCOPY  11-    ENT SURGERY      tonsillectomy    GYN SURGERY  1987    hysterectomy    GYN SURGERY  2014    right ovary removed at [facility]    PHACOEMULSIFICATION WITH STANDARD INTRAOCULAR LENS IMPLANT Left 9/10/2015    Procedure: PHACOEMULSIFICATION WITH STANDARD INTRAOCULAR LENS IMPLANT;  Surgeon: Ray Lucero MD;  Location: [facility] OR    PHACOEMULSIFICATION WITH STANDARD INTRAOCULAR LENS IMPLANT Right 9/24/2015    Procedure: PHACOEMULSIFICATION WITH STANDARD INTRAOCULAR LENS IMPLANT;  Surgeon: Ray Lucero MD;  Location: [facility] OR      Social History  Sammie  reports that she quit smoking about 40 years ago. Her smoking use included cigarettes. She started smoking about 58 years ago. She has a 34.9 pack-year smoking history. She has never used smokeless tobacco. She reports that she does not drink alcohol and does not use drugs.    Family History  Sammie's family history includes Blood Disease in  her father; Breast Cancer in her sister; C.A.D. in her mother; Heart Disease in her father and mother.    Home Medications  Current Outpatient Medications   Medication Instructions    amitriptyline (ELAVIL) 25 mg, Oral, AT BEDTIME    amLODIPine (NORVASC) 5 MG tablet TAKE 1 TABLET BY MOUTH ONCE DAILY EXTRA DOSE IF BLOOD PRESSURE IS GREATER THAN 140 90    Ascorbic Acid (VITAMIN C PO) 500 mg, Oral, 2 TIMES DAILY    aspirin 81 MG tablet 1 tablet, Oral, DAILY    Benzocaine-Menthol (DERMOPLAST EX) Apply externally    bulk laxative (BENEFIBER DRINK MIX) packet 1 packet, Oral, DAILY    carboxymethylcellulose (CARBOXYMETHYLCELLULOSE SODIUM) 0.5 % SOLN ophthalmic solution 1 drop, Ophthalmic, 2 TIMES DAILY    cetirizine (ZYRTEC) 10 mg, Oral, AT BEDTIME    coenzyme Q-10 100 MG TABS 1 tablet, Oral, AT BEDTIME    Cranberry (RA CRANBERRY) 500 MG CAPS 1 capsule, Oral, DAILY    cyanocobalamin (VITAMIN B-12) 1,000 mcg, Oral, DAILY    diclofenac (VOLTAREN) 2 g, Topical, PRN    donepezil (ARICEPT) 5 MG tablet TAKE 1 TABLET BY MOUTH ONCE DAILY FOR 90 DAYS    FLUoxetine (PROZAC) 10 MG tablet TAKE 1 TABLET BY MOUTH ONCE DAILY FOR 90 DAYS    GARLIC PO 1 tablet, Oral, DAILY    KRILL OIL OMEGA-3 PO Oral, 2 TIMES DAILY    Lactobacillus (ACIDOPHILUS PROBIOTIC PO) Oral    losartan (COZAAR) 50 mg, Oral, 2 TIMES DAILY    Multiple Vitamins-Minerals (ONE-A-DAY PROACTIVE 65+ PO) 2 tablets, Oral    rosuvastatin (CRESTOR) 5 mg, Oral, AT BEDTIME    tacrolimus (PROTOPIC) 0.1 % external ointment Topical, 2 TIMES DAILY    Turmeric Curcumin 500 MG CAPS 1 capsule, Oral, DAILY    UNABLE TO FIND Oral, 3 TIMES DAILY PRN, MEDICATION NAME: Bone maximizer 3    UNABLE TO FIND 1 each, Oral, DAILY, Hair, skin and nails       Allergies  Allergies   Allergen Reactions    Codeine      constipation    Sulfa Antibiotics Other (See Comments)     Flush; sulfonamide antibiotics        10 pt ROS neg except as noted in HPI    Physical Exam:  I performed all aspects of the  "physical examination via Telemedicine associated with two way audio and video communication.    Vital Signs: Blood pressure 123/53, pulse 69, temperature 97.6  F (36.4  C), temperature source Tympanic, resp. rate 17, height 1.727 m (5' 8\"), weight 81.6 kg (180 lb), SpO2 98%.    Physical Exam    Gen:  Well-developed, well-nourished, in no acute distress, lying semi-supine in hospital stretcher  HEENT:  Anicteric sclera, PER, hearing intact to voice.  Dry mucous membranes  Resp:  No accessory muscle use, breath sounds clear; no wheezes no rales no rhonchi  Card:  No murmur, normal S1, S2   Abd:  Soft per RN exam, left lower quadrant tenderness to palpation.  Bowel sounds present.    Musc:  Normal strength and movement of the major muscle groups without obvious deformity  Psych:  Good insight, not anxious, not agitated    DATA  Labs:  I have personally reviewed the following studies:  Recent Labs   Lab 05/27/24 0218   POTASSIUM 4.2   CHLORIDE 105   CO2 21*   BUN 19.7   ALBUMIN 5.1   ALKPHOS 88   AST 22   ALT 31   INR 0.89      Recent Labs   Lab 05/27/24 0218   WBC 12.3*   HGB 12.5   HCT 36.6          Imaging: ER imaging reviewed    ASSESSMENT/PLAN:   Severe colitis.  CT reviewed.  Patient has evidence of severe colitis with some rectal bleeding.  Will place on IV Zosyn.  Placed on bowel rest, IV fluids and IV pain meds/antiemetics.  Will likely need colonoscopy in future.  Consider outpatient GI follow-up unless clinical condition dictates inpatient consult.    Leukocytosis.  Mild.  Likely to above.  Monitor.  IV Zosyn.    Rectal bleeding.  Likely to colitis.  Could have ischemic colitis but will treat as infectious at this time.  Will need outpatient GI follow-up.    Hypertension.  Continue baseline meds.    Hyperlipidemia.  Statin.    Mild cognitive impairment.  Continue donepezil.    Disposition.  Patient will be admitted to hospital for treatment detailed above.  Anticipate several day stay    Misc  DVT " prophylaxis: SCDs  Code Status: DNR    The plan was discussed with - Patient

## 2024-05-27 NOTE — PROGRESS NOTES
"[facility] ADMISSION NOTE    Patient admitted to room 333 at approximately 0605 via wheel chair from emergency room. Patient was accompanied by daughter.     Verbal SBAR report received from YOANNA Lloyd prior to patient arrival.     Patient ambulated to bed independently. Patient alert and oriented X 4. Pain is controlled with current analgesics.  Medication(s) being used: dilaudid.  . Admission vital signs: Blood pressure 132/59, pulse 65, temperature 98.1  F (36.7  C), temperature source Tympanic, resp. rate 18, height 1.727 m (5' 8\"), weight 81.6 kg (180 lb), SpO2 97%. Patient and daughter were oriented to plan of care, call light, bed controls, tv, telephone, bathroom, and visiting hours.     Demetria Butt RN      "

## 2024-05-27 NOTE — PROGRESS NOTES
SAFETY CHECKLIST  ID Bands and Risk clasps correct and in place (DNR, Fall risk, Allergy, Latex, Limb):  Yes  All Lines Reconciled and labeled correctly: Yes  Whiteboard updated:Yes  Environmental interventions: Yes  Verify Tele #:  na   Patient arrived to ED today with c/o left sided weakness and numbness that started at 0830 this morning and lasted for about one hour.  Patient states he believes his symptoms are due to his medication.

## 2024-05-27 NOTE — ED NOTES
Patient ambulated from bathroom back to her room.  She voided bright yellow urine.  Urine sent to lab to be held if needed.  IV now infusing and family is at bedside.  Lights dimmed for comfort.

## 2024-05-27 NOTE — PROGRESS NOTES
"[facility]    Medicine Progress Note - Hospitalist Service    Date of Admission:  5/27/2024    Assessment & Plan   Principal Problem:    Acute colitis    Assessment: present on admission. Differential diagnosis includes includes ischemia vs infection. Recent (May 2024) antibiotic use for urinary tract infection. Ate a potato salad that she has been intolerant of in the distant past, but no other family members ill. History of venous insufficiency, aortic calcifications on CT of the abdomen and pelvis. No free air. Elevated lactate at 2.2.     Plan: Admit   Bowel rest              Intravenous fluids    Send stool for Enteric panel and C diff.    Empiric zosyn day1    Active Problems:    Venous stasis ulcers of both lower extremities (H)    Assessment: chronic        Essential hypertension    Assessment: chronic    Plan: continue losartan and amlodipine      Hyperlipidemia    Assessment: chronic    Plan: continue crestor      Malignant neoplasm of female breast (H)      Pancreatic calcification    Assessment: present on admission, not seen on prior 2017 CT (per report)    Plan: outpatient follow up    Add lipase          Diet: Clear Liquid Diet    DVT Prophylaxis: Pneumatic Compression Devices  Warner Catheter: Not present  Lines: None     Cardiac Monitoring: None  Code Status: No CPR- Do NOT Intubate      Clinically Significant Risk Factors Present on Admission           # Hypercalcemia: Highest Ca = 10.5 mg/dL in last 2 days, will monitor as appropriate      # Drug Induced Platelet Defect: home medication list includes an antiplatelet medication   # Hypertension: Noted on problem list      # Overweight: Estimated body mass index is 27.37 kg/m  as calculated from the following:    Height as of this encounter: 1.727 m (5' 8\").    Weight as of this encounter: 81.6 kg (180 lb).       # Asthma: noted on problem list        Disposition Plan     Medically Ready for Discharge: Anticipated in 2-4 " Days             Henrik Johnson MD  Hospitalist Service  [facility]  Securely message with [employer] (more info)  Text page via [facility] Paging/Directory   ______________________________________________________________________    Interval History   Persistent abdomen pain but better. No stools. No nausea, vomiting.     Physical Exam   Vital Signs: Temp: 98.5  F (36.9  C) Temp src: Tympanic BP: 117/47 Pulse: 66   Resp: 20 SpO2: 96 % O2 Device: None (Room air)    Weight: 180 lbs 0 oz    GENERAL: Comfortable, talkative, in no apparent distress.  HEENT: Anicteric, non-injected sclera, mouth moist.   NECK: No JVD.  CARDIOVASCULAR: regular rate and rhythm, no murmur. No lower extremity edema   RESPIRATORY: Clear to auscultation bilaterally, no wheezes, no crackles.  GI: Non-distended, normal bowel sounds, soft, non-tender.  SKIN: No rashes, sores.   NEUROLOGY: Alert and oriented x3, follows commands, speech and language normal.       Medical Decision Making       50 MINUTES SPENT BY ME on the date of service doing chart review, history, exam, documentation & further activities per the note.      Data     I have personally reviewed the following data over the past 24 hrs:    7.7  \   11.2 (L)   / 274     142 109 (H) 14.9 /  97   4.4 22 0.61 \     ALT: 26 AST: 20 AP: 79 TBILI: 0.5   ALB: 4.5 TOT PROTEIN: 6.8 LIPASE: N/A     Procal: N/A CRP: N/A Lactic Acid: 2.2 (H)       INR:  0.89 PTT:  N/A   D-dimer:  N/A Fibrinogen:  N/A       Imaging results reviewed over the past 24 hrs:   Recent Results (from the past 24 hour(s))   CT Abdomen Pelvis w Contrast    Narrative    PROCEDURE INFORMATION:   Exam: CT Abdomen And Pelvis With Contrast   Exam date and time: 5/27/2024 3:13 AM   Age: 74 years old   Clinical indication: Nausea and other: Diarrhea; Additional info: Rectal   bleeding and abdominal pain     TECHNIQUE:   Imaging protocol: Computed tomography of the abdomen and pelvis with contrast.   Radiation  optimization: All CT scans at this facility use at least one of these   dose optimization techniques: automated exposure control; mA and/or kV   adjustment per patient size (includes targeted exams where dose is matched to   clinical indication); or iterative reconstruction.   Contrast material: [id number]-370; Contrast volume: 104 ml; Contrast route:   INTRAVENOUS (IV);      COMPARISON:   No relevant prior studies available.     FINDINGS:   Tubes, catheters and devices: Unremarkable.      Diaphragm: Moderately elevated diaphragms bilaterally.     Liver: Moderate enlargement of the left hepatic lobe and hepatomegaly.   Correlate with LFTs.   Gallbladder and bile ducts: Multiple calcified gallstones are present.   Pancreas: Moderate multifocal cystic changes of the head of the pancreas   etiology indeterminate.   Spleen: Unremarkable. No splenomegaly.   Adrenal glands: Normal. No mass.   Kidneys and ureters: Moderate urinary retention.   Stomach and bowel: There is moderate thickening of the rectosigmoid colon there   is advanced left colitis from the splenic flexure onwards.   Appendix: No evidence of appendicitis.     Intraperitoneal space: Unremarkable. No free air. No significant fluid   collection.   Vasculature: The aorta demonstrates moderate atherosclerotic calcification.   Descend indwelling left iliac external iliac vein and common femoral vein   stent. Mild stenosis origin of the celiac trunk.   Lymph nodes: Small periaortic lymph nodes.   Urinary bladder: Unremarkable as visualized.   Reproductive: Unremarkable as visualized.   Bones/joints: Unremarkable. No acute fracture.   Soft tissues: Unremarkable.       Impression    IMPRESSION:   1.   Severe colitis left colon and rectosigmoid colon possible infectious   inflammatory colitis or other source.   2.   Moderate urinary retention correlate with urinalysis.   3.   Heterogeneous and atrophy or microcystic changes noted of the head of the   pancreas correlate  with serum lipase level and or MRCP. Consider GI   consultation follow-up.   4.   Extensive cholelithiasis. No acute cholecystitis.   5.   5 per incidental iliac venous stent appears patent.     THIS DOCUMENT HAS BEEN ELECTRONICALLY SIGNED BY BOZENA IBARRA MD

## 2024-05-28 ENCOUNTER — APPOINTMENT (OUTPATIENT)
Dept: GENERAL RADIOLOGY | Facility: OTHER | Age: 74
DRG: 391 | End: 2024-05-28
Attending: INTERNAL MEDICINE
Payer: COMMERCIAL

## 2024-05-28 LAB
ANION GAP SERPL CALCULATED.3IONS-SCNC: 10 MMOL/L (ref 7–15)
BUN SERPL-MCNC: 7.1 MG/DL (ref 8–23)
CALCIUM SERPL-MCNC: 9.7 MG/DL (ref 8.8–10.2)
CHLORIDE SERPL-SCNC: 108 MMOL/L (ref 98–107)
CREAT SERPL-MCNC: 0.63 MG/DL (ref 0.51–0.95)
DEPRECATED HCO3 PLAS-SCNC: 22 MMOL/L (ref 22–29)
EGFRCR SERPLBLD CKD-EPI 2021: >90 ML/MIN/1.73M2
ERYTHROCYTE [DISTWIDTH] IN BLOOD BY AUTOMATED COUNT: 12.3 % (ref 10–15)
GLUCOSE SERPL-MCNC: 104 MG/DL (ref 70–99)
HCT VFR BLD AUTO: 32.5 % (ref 35–47)
HGB BLD-MCNC: 10.6 G/DL (ref 11.7–15.7)
LACTATE SERPL-SCNC: 0.7 MMOL/L (ref 0.7–2)
MAGNESIUM SERPL-MCNC: 2.2 MG/DL (ref 1.7–2.3)
MCH RBC QN AUTO: 28.7 PG (ref 26.5–33)
MCHC RBC AUTO-ENTMCNC: 32.6 G/DL (ref 31.5–36.5)
MCV RBC AUTO: 88 FL (ref 78–100)
PLATELET # BLD AUTO: 268 10E3/UL (ref 150–450)
POTASSIUM SERPL-SCNC: 4.1 MMOL/L (ref 3.4–5.3)
RBC # BLD AUTO: 3.69 10E6/UL (ref 3.8–5.2)
SODIUM SERPL-SCNC: 140 MMOL/L (ref 135–145)
WBC # BLD AUTO: 7.7 10E3/UL (ref 4–11)

## 2024-05-28 PROCEDURE — 120N000001 HC R&B MED SURG/OB

## 2024-05-28 PROCEDURE — 258N000003 HC RX IP 258 OP 636: Performed by: INTERNAL MEDICINE

## 2024-05-28 PROCEDURE — 250N000013 HC RX MED GY IP 250 OP 250 PS 637: Performed by: INTERNAL MEDICINE

## 2024-05-28 PROCEDURE — 250N000011 HC RX IP 250 OP 636: Performed by: INTERNAL MEDICINE

## 2024-05-28 PROCEDURE — 250N000011 HC RX IP 250 OP 636: Performed by: FAMILY MEDICINE

## 2024-05-28 PROCEDURE — 85027 COMPLETE CBC AUTOMATED: CPT | Performed by: INTERNAL MEDICINE

## 2024-05-28 PROCEDURE — 36415 COLL VENOUS BLD VENIPUNCTURE: CPT | Performed by: INTERNAL MEDICINE

## 2024-05-28 PROCEDURE — 74019 RADEX ABDOMEN 2 VIEWS: CPT

## 2024-05-28 PROCEDURE — 83735 ASSAY OF MAGNESIUM: CPT | Performed by: INTERNAL MEDICINE

## 2024-05-28 PROCEDURE — 99232 SBSQ HOSP IP/OBS MODERATE 35: CPT | Performed by: INTERNAL MEDICINE

## 2024-05-28 PROCEDURE — 83605 ASSAY OF LACTIC ACID: CPT | Performed by: INTERNAL MEDICINE

## 2024-05-28 PROCEDURE — 80048 BASIC METABOLIC PNL TOTAL CA: CPT | Performed by: INTERNAL MEDICINE

## 2024-05-28 RX ADMIN — Medication 250 MG: at 09:33

## 2024-05-28 RX ADMIN — OXYCODONE HYDROCHLORIDE 5 MG: 5 TABLET ORAL at 09:44

## 2024-05-28 RX ADMIN — LORATADINE 10 MG: 10 TABLET ORAL at 21:07

## 2024-05-28 RX ADMIN — FAMOTIDINE 20 MG: 10 INJECTION, SOLUTION INTRAVENOUS at 21:08

## 2024-05-28 RX ADMIN — ROSUVASTATIN CALCIUM 5 MG: 5 TABLET, FILM COATED ORAL at 21:07

## 2024-05-28 RX ADMIN — ACETAMINOPHEN 650 MG: 325 TABLET, FILM COATED ORAL at 19:38

## 2024-05-28 RX ADMIN — OXYCODONE HYDROCHLORIDE AND ACETAMINOPHEN 500 MG: 500 TABLET ORAL at 09:33

## 2024-05-28 RX ADMIN — LOSARTAN POTASSIUM 100 MG: 50 TABLET, FILM COATED ORAL at 09:33

## 2024-05-28 RX ADMIN — AMLODIPINE BESYLATE 5 MG: 5 TABLET ORAL at 21:07

## 2024-05-28 RX ADMIN — ACETAMINOPHEN 650 MG: 325 TABLET, FILM COATED ORAL at 02:55

## 2024-05-28 RX ADMIN — PIPERACILLIN AND TAZOBACTAM 3.38 G: 3; .375 INJECTION, POWDER, LYOPHILIZED, FOR SOLUTION INTRAVENOUS at 14:09

## 2024-05-28 RX ADMIN — OXYCODONE HYDROCHLORIDE 5 MG: 5 TABLET ORAL at 14:15

## 2024-05-28 RX ADMIN — OXYCODONE HYDROCHLORIDE AND ACETAMINOPHEN 500 MG: 500 TABLET ORAL at 21:07

## 2024-05-28 RX ADMIN — ACETAMINOPHEN 650 MG: 325 TABLET, FILM COATED ORAL at 09:44

## 2024-05-28 RX ADMIN — PIPERACILLIN AND TAZOBACTAM 3.38 G: 3; .375 INJECTION, POWDER, LYOPHILIZED, FOR SOLUTION INTRAVENOUS at 19:57

## 2024-05-28 RX ADMIN — Medication 250 MG: at 21:07

## 2024-05-28 RX ADMIN — DEXTRAN 70, GLYCERIN, HYPROMELLOSE 1 DROP: 1; 2; 3 SOLUTION/ DROPS OPHTHALMIC at 09:44

## 2024-05-28 RX ADMIN — PIPERACILLIN AND TAZOBACTAM 3.38 G: 3; .375 INJECTION, POWDER, LYOPHILIZED, FOR SOLUTION INTRAVENOUS at 05:23

## 2024-05-28 RX ADMIN — SODIUM CHLORIDE: 9 INJECTION, SOLUTION INTRAVENOUS at 14:16

## 2024-05-28 RX ADMIN — CYANOCOBALAMIN TAB 500 MCG 1000 MCG: 500 TAB at 09:33

## 2024-05-28 RX ADMIN — SODIUM CHLORIDE: 9 INJECTION, SOLUTION INTRAVENOUS at 02:20

## 2024-05-28 RX ADMIN — ACETAMINOPHEN 650 MG: 325 TABLET, FILM COATED ORAL at 14:15

## 2024-05-28 RX ADMIN — CALCIUM CARBONATE (ANTACID) CHEW TAB 500 MG 1000 MG: 500 CHEW TAB at 02:51

## 2024-05-28 RX ADMIN — OXYCODONE HYDROCHLORIDE 5 MG: 5 TABLET ORAL at 19:38

## 2024-05-28 RX ADMIN — FAMOTIDINE 20 MG: 10 INJECTION, SOLUTION INTRAVENOUS at 02:10

## 2024-05-28 RX ADMIN — DONEPEZIL HYDROCHLORIDE 10 MG: 5 TABLET, FILM COATED ORAL at 21:07

## 2024-05-28 ASSESSMENT — ACTIVITIES OF DAILY LIVING (ADL)
ADLS_ACUITY_SCORE: 22
ADLS_ACUITY_SCORE: 23
ADLS_ACUITY_SCORE: 22
ADLS_ACUITY_SCORE: 23
ADLS_ACUITY_SCORE: 22
ADLS_ACUITY_SCORE: 23
ADLS_ACUITY_SCORE: 22
ADLS_ACUITY_SCORE: 23
ADLS_ACUITY_SCORE: 23
ADLS_ACUITY_SCORE: 22
ADLS_ACUITY_SCORE: 23
ADLS_ACUITY_SCORE: 22
ADLS_ACUITY_SCORE: 23

## 2024-05-28 NOTE — PLAN OF CARE
A&Ox4. VSS, afebrile. Independent in room. Abdominal pain on palpation. No BM on shift today. Small smear of blood with wiping at end of shift. Bowel sounds audible. LS clear. No cough. IV infusing normal saline.     Temp: 97.8  F (36.6  C) Temp src: Tympanic BP: 120/59 Pulse: 66   Resp: 14 SpO2: 100 % O2 Device: None (Room air)      Albina Jenkins RN .......  5/28/2024  6:58 PM      Goal Outcome Evaluation:      Plan of Care Reviewed With: patient    Overall Patient Progress: improving    Outcome Evaluation: VSS, afebrile, independent in room, IV infusing NS, clear liquids

## 2024-05-28 NOTE — PROGRESS NOTES
Interdisciplinary Discharge Planning Note    Anticipated Discharge Date:5/30-5/31    Anticipated Discharge Location: Home    Clinical Needs Before Discharge:   IV Fluids, Bowel rest, pain control, C.diff test    Treatment Needs After Discharge:  None identified    Potential Barriers to Discharge: None Identified     ANSELMO Ruano  5/28/2024,  12:42 PM

## 2024-05-28 NOTE — PLAN OF CARE
SAFETY CHECKLIST  ID Bands and Risk clasps correct and in place (DNR, Fall risk, Allergy, Latex, Limb):  Yes  All Lines Reconciled and labeled correctly: Yes  Whiteboard updated:Yes  Environmental interventions: Yes  Verify Tele #: N/A    Albina Jenkins RN .......  5/28/2024  7:18 AM

## 2024-05-28 NOTE — PROGRESS NOTES
Physician Attestation   I, Henrik Johnson MD, was present with the medical/DES student who participated in the service and in the documentation of the note.  I have verified the history and personally performed the physical exam and medical decision making.  I agree with the assessment and plan of care as documented in the note.      Key findings: Colitis, likely ischemic. Abdomen xray shows no free air. Lactate normalized. Continue conservative management.         I have personally reviewed the following data over the past 24 hrs:    7.7  \   10.6 (L)   / 268     140 108 (H) 7.1 (L) /  104 (H)   4.1 22 0.63 \     Procal: N/A CRP: N/A Lactic Acid: 0.7           Henrik Johnson MD  Date of Service (when I saw the patient): 05/28/24    [facility]    Progress Note - Hospitalist Service       Date of Admission:  5/28/2024    Assessment & Plan   Sammie Heaton is a 74 year old female admitted on 5/27/2024. She has a history of HTN and chronic venous stasis and is admitted for colitis    Assessment & Plan  Principal Problem:    Acute colitis    Assessment: present on admission. Differential diagnosis includes ischemia (perfusion vs. Vasculitis) vs infection vs. inflammatory. Does not have a fib or PAD, however does have venous insufficiency s/p iliac vein stent. Aortic calcifications on CT of the abdomen and pelvis. No free air. Lactic acid WNL. Infectious cause possible-recent (May 2024) antibiotic use for urinary tract infection. However, she is not having diarrhea currently. Ate a potato salad that she has been intolerant of in the distant past, but no other family members ill. Possibly UC with colitis located in left descending and sigmoid colon, likely not Crohn's due to age and colitis location. XR shows gas/fluid levels, suspect ileus. Could be SBO as well. Will continue with supportive cares.     Plan: Admit              Bowel rest              Intravenous fluids               Send  "stool for Enteric panel and C diff.               Empiric zosyn day 3              Consider CRP     Active Problems:    Venous stasis ulcers of both lower extremities (H)    Assessment: chronic, follows with [place]         Essential hypertension    Assessment: chronic    Plan: continue losartan and amlodipine       Hyperlipidemia    Assessment: chronic    Plan: continue crestor       Malignant neoplasm of female breast (H)    Assessment: Hx of BRCA 1 s/p bilateral mastectomy        Pancreatic calcification    Assessment: present on admission, not seen on prior 2017 CT (per report). Lipase WNL    Plan: outpatient follow up                     Diet: Clear Liquid Diet    DVT Prophylaxis: Pneumatic Compression Devices  Warner Catheter: Not present  Fluids:  mL/hr  Lines: None     Cardiac Monitoring: None  Code Status: No CPR- Do NOT Intubate      Clinically Significant Risk Factors           # Hypercalcemia: Highest Ca = 10.5 mg/dL in last 2 days, will monitor as appropriate        # Hypertension: Noted on problem list        # Overweight: Estimated body mass index is 27.89 kg/m  as calculated from the following:    Height as of this encounter: 1.727 m (5' 8\").    Weight as of this encounter: 83.2 kg (183 lb 6.4 oz)., PRESENT ON ADMISSION     # Asthma: noted on problem list        Disposition Plan     Expected Discharge Date: 05/29/2024                The patient's care was discussed with the Attending Physician, Dr. Dr. Johnson .    BELINDA GLEASON-POST  Medical Student  Hospitalist Service  [facility]  Securely message with [employer] (more info)  Text page via [facility] Paging/Directory   ______________________________________________________________________    Interval History   Persistent abdomen pain but better. No stools. No nausea, vomiting. Still has not had a BM since evening of 5/26. Denies CP, SOB.     Physical Exam   Vital Signs: Temp: 98.6  F (37  C) Temp src: Tympanic BP: 139/58 Pulse: 71  "  Resp: 16 SpO2: 98 % O2 Device: None (Room air)    Weight: 183 lbs 6.4 oz    GENERAL: Comfortable, talkative, in no apparent distress.  HEENT: Anicteric, non-injected sclera, mouth moist.   NECK: No JVD.  CARDIOVASCULAR: regular rate and rhythm, no murmur, rub, or gallop. No lower extremity edema   RESPIRATORY: Clear to auscultation bilaterally, no wheezes, no crackles.  GI: Distended, tympanic on percussion, tenderness to palpation to LLQ, deep palpation to all other quadrants causes referred pain to LLQ, +rebound tenderness to LLQ.  SKIN: No rashes, sores.   NEUROLOGY: Alert and oriented x3, follows commands, speech and language normal.     Medical Decision Making       Please see A&P for additional details of medical decision making.      Data     I have personally reviewed the following data over the past 24 hrs:    7.7  \   10.6 (L)   / 268     140 108 (H) 7.1 (L) /  104 (H)   4.1 22 0.63 \     ALT: N/A AST: N/A AP: N/A TBILI: N/A   ALB: N/A TOT PROTEIN: N/A LIPASE: N/A     Procal: N/A CRP: N/A Lactic Acid: 0.7         Imaging results reviewed over the past 24 hrs:   Recent Results (from the past 24 hour(s))   XR Abdomen 2 Views    Narrative    PROCEDURE: XR ABDOMEN 2 VIEWS 5/28/2024 10:39 AM    HISTORY: Worried about perforation, flat and upright    COMPARISONS: CT dated 5/27/2024.    TECHNIQUE: Supine and upright views.    FINDINGS: No free intraperitoneal air is seen.    There are gas-filled small bowel loops in the left side of the abdomen  measuring up to 3.3 cm in transverse dimension. There are some gas  fluid levels on the upright view. Gas and stool is seen within the  colon to the level of the rectum.    Gallstones are seen in the right upper quadrant.    Degenerative changes seen in the spine. There is a left-sided iliac  stent.         Impression    IMPRESSION: No free air.    Nonspecific bowel gas pattern with mild small bowel dilatation and a  few short gas fluid levels.    GABI REYNOLDS MD          SYSTEM ID:  [id number]

## 2024-05-28 NOTE — PLAN OF CARE
Goal Outcome Evaluation:      Plan of Care Reviewed With: patient    Overall Patient Progress: improvingOverall Patient Progress: improving         Abdomen is soft, patient reports pain upon palpation. Bowel sounds are active. No bowel movement this shift, still awaiting stool sample, Enteric precautions in place until CDIff can be ruled out. Tylenol and Dilaudid used for pain. Patient also reported heartburn, relieved with Tums. Tolerating clear liquid diet.    Pt is requesting a 3 month refill of her OCP

## 2024-05-29 ENCOUNTER — APPOINTMENT (OUTPATIENT)
Dept: CARDIOLOGY | Facility: OTHER | Age: 74
DRG: 391 | End: 2024-05-29
Attending: INTERNAL MEDICINE
Payer: COMMERCIAL

## 2024-05-29 LAB
ALBUMIN SERPL BCG-MCNC: 4 G/DL (ref 3.5–5.2)
ALP SERPL-CCNC: 78 U/L (ref 40–150)
ALT SERPL W P-5'-P-CCNC: 23 U/L (ref 0–50)
ANION GAP SERPL CALCULATED.3IONS-SCNC: 11 MMOL/L (ref 7–15)
AST SERPL W P-5'-P-CCNC: 19 U/L (ref 0–45)
BILIRUB SERPL-MCNC: 0.5 MG/DL
BUN SERPL-MCNC: 6.9 MG/DL (ref 8–23)
CALCIUM SERPL-MCNC: 9.2 MG/DL (ref 8.8–10.2)
CHLORIDE SERPL-SCNC: 109 MMOL/L (ref 98–107)
CREAT SERPL-MCNC: 0.71 MG/DL (ref 0.51–0.95)
CRP SERPL-MCNC: 40.81 MG/L
DEPRECATED HCO3 PLAS-SCNC: 23 MMOL/L (ref 22–29)
EGFRCR SERPLBLD CKD-EPI 2021: 89 ML/MIN/1.73M2
ERYTHROCYTE [DISTWIDTH] IN BLOOD BY AUTOMATED COUNT: 12.2 % (ref 10–15)
GLUCOSE SERPL-MCNC: 95 MG/DL (ref 70–99)
HCT VFR BLD AUTO: 32.2 % (ref 35–47)
HGB BLD-MCNC: 10.4 G/DL (ref 11.7–15.7)
HOLD SPECIMEN: NORMAL
HOLD SPECIMEN: NORMAL
LVEF ECHO: NORMAL
MAGNESIUM SERPL-MCNC: 2.2 MG/DL (ref 1.7–2.3)
MCH RBC QN AUTO: 28.8 PG (ref 26.5–33)
MCHC RBC AUTO-ENTMCNC: 32.3 G/DL (ref 31.5–36.5)
MCV RBC AUTO: 89 FL (ref 78–100)
PLATELET # BLD AUTO: 258 10E3/UL (ref 150–450)
POTASSIUM SERPL-SCNC: 3.8 MMOL/L (ref 3.4–5.3)
PROT SERPL-MCNC: 6.6 G/DL (ref 6.4–8.3)
RBC # BLD AUTO: 3.61 10E6/UL (ref 3.8–5.2)
SODIUM SERPL-SCNC: 143 MMOL/L (ref 135–145)
WBC # BLD AUTO: 6.5 10E3/UL (ref 4–11)

## 2024-05-29 PROCEDURE — 86140 C-REACTIVE PROTEIN: CPT | Performed by: INTERNAL MEDICINE

## 2024-05-29 PROCEDURE — 120N000001 HC R&B MED SURG/OB

## 2024-05-29 PROCEDURE — 258N000003 HC RX IP 258 OP 636: Performed by: INTERNAL MEDICINE

## 2024-05-29 PROCEDURE — 36415 COLL VENOUS BLD VENIPUNCTURE: CPT | Performed by: INTERNAL MEDICINE

## 2024-05-29 PROCEDURE — 250N000013 HC RX MED GY IP 250 OP 250 PS 637: Performed by: INTERNAL MEDICINE

## 2024-05-29 PROCEDURE — 93306 TTE W/DOPPLER COMPLETE: CPT | Mod: 26 | Performed by: INTERNAL MEDICINE

## 2024-05-29 PROCEDURE — 999N000208 ECHOCARDIOGRAM COMPLETE

## 2024-05-29 PROCEDURE — 83735 ASSAY OF MAGNESIUM: CPT | Performed by: INTERNAL MEDICINE

## 2024-05-29 PROCEDURE — 93005 ELECTROCARDIOGRAM TRACING: CPT

## 2024-05-29 PROCEDURE — 80053 COMPREHEN METABOLIC PANEL: CPT | Performed by: INTERNAL MEDICINE

## 2024-05-29 PROCEDURE — 258N000001 HC RX 258: Performed by: INTERNAL MEDICINE

## 2024-05-29 PROCEDURE — C8929 TTE W OR WO FOL WCON,DOPPLER: HCPCS

## 2024-05-29 PROCEDURE — 93010 ELECTROCARDIOGRAM REPORT: CPT | Performed by: STUDENT IN AN ORGANIZED HEALTH CARE EDUCATION/TRAINING PROGRAM

## 2024-05-29 PROCEDURE — 99232 SBSQ HOSP IP/OBS MODERATE 35: CPT | Performed by: INTERNAL MEDICINE

## 2024-05-29 PROCEDURE — 85027 COMPLETE CBC AUTOMATED: CPT | Performed by: INTERNAL MEDICINE

## 2024-05-29 PROCEDURE — 250N000011 HC RX IP 250 OP 636: Performed by: FAMILY MEDICINE

## 2024-05-29 PROCEDURE — 250N000011 HC RX IP 250 OP 636: Performed by: INTERNAL MEDICINE

## 2024-05-29 PROCEDURE — 255N000002 HC RX 255 OP 636: Performed by: INTERNAL MEDICINE

## 2024-05-29 PROCEDURE — 250N000013 HC RX MED GY IP 250 OP 250 PS 637: Performed by: FAMILY MEDICINE

## 2024-05-29 RX ORDER — ACYCLOVIR 200 MG/1
10 CAPSULE ORAL 2 TIMES DAILY PRN
Status: DISCONTINUED | OUTPATIENT
Start: 2024-05-29 | End: 2024-05-30 | Stop reason: HOSPADM

## 2024-05-29 RX ORDER — DIPHENHYDRAMINE HYDROCHLORIDE AND LIDOCAINE HYDROCHLORIDE AND ALUMINUM HYDROXIDE AND MAGNESIUM HYDRO
10 KIT EVERY 6 HOURS PRN
Status: DISCONTINUED | OUTPATIENT
Start: 2024-05-29 | End: 2024-05-30 | Stop reason: HOSPADM

## 2024-05-29 RX ADMIN — SODIUM CHLORIDE: 9 INJECTION, SOLUTION INTRAVENOUS at 18:12

## 2024-05-29 RX ADMIN — SODIUM CHLORIDE: 9 INJECTION, SOLUTION INTRAVENOUS at 03:11

## 2024-05-29 RX ADMIN — AMLODIPINE BESYLATE 5 MG: 5 TABLET ORAL at 21:04

## 2024-05-29 RX ADMIN — DIPHENHYDRAMINE HYDROCHLORIDE AND LIDOCAINE HYDROCHLORIDE AND ALUMINUM HYDROXIDE AND MAGNESIUM HYDRO 10 ML: KIT at 21:08

## 2024-05-29 RX ADMIN — OXYCODONE HYDROCHLORIDE 5 MG: 5 TABLET ORAL at 07:51

## 2024-05-29 RX ADMIN — Medication 250 MG: at 21:02

## 2024-05-29 RX ADMIN — OXYCODONE HYDROCHLORIDE AND ACETAMINOPHEN 500 MG: 500 TABLET ORAL at 21:05

## 2024-05-29 RX ADMIN — LORATADINE 10 MG: 10 TABLET ORAL at 21:05

## 2024-05-29 RX ADMIN — SODIUM CHLORIDE 10 ML: 9 INJECTION INTRAMUSCULAR; INTRAVENOUS; SUBCUTANEOUS at 15:00

## 2024-05-29 RX ADMIN — OXYCODONE HYDROCHLORIDE 5 MG: 5 TABLET ORAL at 16:18

## 2024-05-29 RX ADMIN — Medication 250 MG: at 10:07

## 2024-05-29 RX ADMIN — PIPERACILLIN AND TAZOBACTAM 3.38 G: 3; .375 INJECTION, POWDER, LYOPHILIZED, FOR SOLUTION INTRAVENOUS at 19:39

## 2024-05-29 RX ADMIN — OXYCODONE HYDROCHLORIDE AND ACETAMINOPHEN 500 MG: 500 TABLET ORAL at 10:07

## 2024-05-29 RX ADMIN — PIPERACILLIN AND TAZOBACTAM 3.38 G: 3; .375 INJECTION, POWDER, LYOPHILIZED, FOR SOLUTION INTRAVENOUS at 01:29

## 2024-05-29 RX ADMIN — ROSUVASTATIN CALCIUM 5 MG: 5 TABLET, FILM COATED ORAL at 21:04

## 2024-05-29 RX ADMIN — FAMOTIDINE 20 MG: 10 INJECTION, SOLUTION INTRAVENOUS at 10:07

## 2024-05-29 RX ADMIN — ACETAMINOPHEN 650 MG: 325 TABLET, FILM COATED ORAL at 07:50

## 2024-05-29 RX ADMIN — PERFLUTREN 1 ML: 6.52 INJECTION, SUSPENSION INTRAVENOUS at 15:23

## 2024-05-29 RX ADMIN — PIPERACILLIN AND TAZOBACTAM 3.38 G: 3; .375 INJECTION, POWDER, LYOPHILIZED, FOR SOLUTION INTRAVENOUS at 07:52

## 2024-05-29 RX ADMIN — FAMOTIDINE 20 MG: 10 INJECTION, SOLUTION INTRAVENOUS at 20:58

## 2024-05-29 RX ADMIN — DEXTRAN 70, GLYCERIN, HYPROMELLOSE 1 DROP: 1; 2; 3 SOLUTION/ DROPS OPHTHALMIC at 21:09

## 2024-05-29 RX ADMIN — LOSARTAN POTASSIUM 100 MG: 50 TABLET, FILM COATED ORAL at 10:07

## 2024-05-29 RX ADMIN — DONEPEZIL HYDROCHLORIDE 10 MG: 5 TABLET, FILM COATED ORAL at 21:04

## 2024-05-29 RX ADMIN — PIPERACILLIN AND TAZOBACTAM 3.38 G: 3; .375 INJECTION, POWDER, LYOPHILIZED, FOR SOLUTION INTRAVENOUS at 15:19

## 2024-05-29 RX ADMIN — SODIUM CHLORIDE 10 ML: 9 INJECTION INTRAMUSCULAR; INTRAVENOUS; SUBCUTANEOUS at 15:01

## 2024-05-29 RX ADMIN — DEXTRAN 70, GLYCERIN, HYPROMELLOSE 1 DROP: 1; 2; 3 SOLUTION/ DROPS OPHTHALMIC at 10:13

## 2024-05-29 RX ADMIN — ACETAMINOPHEN 650 MG: 325 TABLET, FILM COATED ORAL at 21:05

## 2024-05-29 RX ADMIN — ACETAMINOPHEN 650 MG: 325 TABLET, FILM COATED ORAL at 01:29

## 2024-05-29 RX ADMIN — ACETAMINOPHEN 650 MG: 325 TABLET, FILM COATED ORAL at 16:18

## 2024-05-29 RX ADMIN — OXYCODONE HYDROCHLORIDE 5 MG: 5 TABLET ORAL at 21:05

## 2024-05-29 ASSESSMENT — ACTIVITIES OF DAILY LIVING (ADL)
ADLS_ACUITY_SCORE: 23
ADLS_ACUITY_SCORE: 25
ADLS_ACUITY_SCORE: 23
ADLS_ACUITY_SCORE: 25
ADLS_ACUITY_SCORE: 25
ADLS_ACUITY_SCORE: 23
ADLS_ACUITY_SCORE: 23
ADLS_ACUITY_SCORE: 25
ADLS_ACUITY_SCORE: 23
ADLS_ACUITY_SCORE: 25
ADLS_ACUITY_SCORE: 23
ADLS_ACUITY_SCORE: 25
ADLS_ACUITY_SCORE: 23
ADLS_ACUITY_SCORE: 25
ADLS_ACUITY_SCORE: 25
ADLS_ACUITY_SCORE: 23
ADLS_ACUITY_SCORE: 23

## 2024-05-29 NOTE — PROGRESS NOTES
Interdisciplinary Discharge Planning Note    Anticipated Discharge Date:5/31-6/1    Anticipated Discharge Location: Home    Clinical Needs Before Discharge:   Fluids, bowel rest,     Treatment Needs After Discharge:  None identified    Potential Barriers to Discharge: None Identified     ANSELMO Ruano  5/29/2024,  12:54 PM

## 2024-05-29 NOTE — PLAN OF CARE
"A&Ox4. VSS, afebrile. Pain 1/10 managed with PRN Tylenol and Oxycodone. Given once on shift today this AM. Stand by assist in room, pt stated her legs \"felt wobbly, like jello\" this AM so told patient to use call light and put on bed alarm. Receiving IV antibiotics. Abdomen tender LLQ, LUQ and epigastric on palpation. Bowel sounds hyperactive. No bowel movement on shift, voiding adequately. IV infusing at 100 mL/hr. LS clear. Ambulated outside of room with aid on shift. Advanced to regular diet on shift. Denies nausea or increased abdominal discomfort with oral food intake.    Temp: 99  F (37.2  C) Temp src: Tympanic BP: (!) 144/60 Pulse: 67   Resp: 14 SpO2: 98 % O2 Device: None (Room air)      Albina Jenkins RN .......  5/29/2024  3:42 PM      Goal Outcome Evaluation:      Plan of Care Reviewed With: patient    Overall Patient Progress: improving    Outcome Evaluation: VSS, afebrile, SBA in room, IV infusing NS, IV antibiotics, advanced to regular diet      "

## 2024-05-29 NOTE — PLAN OF CARE
I took over care at 0100. Pt did not have any bloody stools over the night. She does complain of tenderness/pain in the abdomen. She received tylenol from me and was able to sleep on and off during the night. Will continue to monitor.      Problem: Adult Inpatient Plan of Care  Goal: Optimal Comfort and Wellbeing  Outcome: Progressing  Intervention: Monitor Pain and Promote Comfort  Recent Flowsheet Documentation  Taken 5/29/2024 0300 by Charbel Guzman, RN  Pain Management Interventions:   distraction   repositioned     Problem: Bowel Disease, Inflammatory (Ulcerative Colitis or Crohn's Disease)  Goal: Optimal Adaptation to Chronic Illness  Outcome: Progressing  Intervention: Support Psychosocial Response to Illness  Recent Flowsheet Documentation  Taken 5/29/2024 0300 by Charbel Guzman, RN  Supportive Measures:   active listening utilized   verbalization of feelings encouraged

## 2024-05-29 NOTE — PROGRESS NOTES
Agitated saline test performed with echocardiogram per order per protocol.  Patient instructed on procedure and verbal consent obtained.  Patient tolerated procedure.  Kasey Bearden RN on 5/29/2024 at 2:57 PM

## 2024-05-29 NOTE — PLAN OF CARE
SAFETY CHECKLIST  ID Bands and Risk clasps correct and in place (DNR, Fall risk, Allergy, Latex, Limb):  Yes  All Lines Reconciled and labeled correctly: Yes  Whiteboard updated:Yes  Environmental interventions: Yes  Verify Tele #: N/A    Albina Jenkins RN .......  5/29/2024  7:21 AM

## 2024-05-29 NOTE — PROGRESS NOTES
Physician Attestation   I, Henrik Johnson MD, was present with the medical/DES student who participated in the service and in the documentation of the note.  I have verified the history and personally performed the physical exam and medical decision making.  I agree with the assessment and plan of care as documented in the note.      Key findings: distended soft abdomen.    Please see A&P for additional details of medical decision making.    I have personally reviewed the following data over the past 24 hrs:    6.5  \   10.4 (L)   / 258     143 109 (H) 6.9 (L) /  95   3.8 23 0.71 \     ALT: 23 AST: 19 AP: 78 TBILI: 0.5   ALB: 4.0 TOT PROTEIN: 6.6 LIPASE: N/A     Procal: N/A CRP: 40.81 (H) Lactic Acid: N/A           Henrik Johnson MD  Date of Service (when I saw the patient): 05/29/24    [facility]    Progress Note - Hospitalist Service       Date of Admission:  5/27/2024    Assessment & Plan   Sammie Heaton is a 74 year old female admitted on 5/27/2024. She has a history of HTN and chronic venous stasis and is admitted for colitis     Assessment & Plan  Principal Problem:    Acute colitis    Assessment: present on admission. Differential diagnosis includes ischemia (perfusion vs. Vasculitis) vs infection vs. inflammatory. Does not have diagnosed a fib or PAD, however could have had embolic event from DVT/PFO or a fib. She does have venous insufficiency s/p iliac vein stent. Aortic calcifications on CT of the abdomen and pelvis. No free air. Lactic acid WNL. Infectious cause possible-recent (May 2024) antibiotic use for urinary tract infection. However, she is not having diarrhea currently. Ate a potato salad that she has been intolerant of in the distant past, but no other family members ill. Possibly UC with colitis located in left descending and sigmoid colon, likely not Crohn's due to age and colitis location. CRP elevated. XR shows gas/fluid levels without free air, suspect  "ileus. Could be SBO as well. Will continue with supportive cares.     Plan:               Advance diet as tolerated                Intravenous fluids               Send stool for Enteric panel and C diff.               Empiric zosyn day 3              EKG/ECHO today   Consider Zio patch on discharge      Active Problems:    Venous stasis ulcers of both lower extremities (H)    Assessment: chronic, follows with [place]         Essential hypertension    Assessment: chronic    Plan: continue losartan and amlodipine       Hyperlipidemia    Assessment: chronic    Plan: continue crestor       Malignant neoplasm of female breast (H)    Assessment: Hx of BRCA 1 s/p bilateral mastectomy in 2019; s/p hysterectomy with left salpingo-oophorectomy in 1987 and right oophorectomy in 2014       Pancreatic calcification    Assessment: present on admission, not seen on prior 2017 CT (per report). Lipase WNL    Plan: outpatient follow up                        Diet: Clear Liquid Diet    DVT Prophylaxis: Pneumatic Compression Devices  Warner Catheter: Not present  Fluids: 100 mL/hr  Lines: None     Cardiac Monitoring: None  Code Status: No CPR- Do NOT Intubate      Clinically Significant Risk Factors                  # Hypertension: Noted on problem list        # Overweight: Estimated body mass index is 27.78 kg/m  as calculated from the following:    Height as of this encounter: 1.727 m (5' 8\").    Weight as of this encounter: 82.9 kg (182 lb 11.2 oz)., PRESENT ON ADMISSION     # Asthma: noted on problem list        Disposition Plan     Expected Discharge Date: 05/29/2024                The patient's care was discussed with the Attending Physician, Dr. Johnson .    BELINDA GLEASON-POST, MS4  Medical Student  Hospitalist Service  [facility]  Securely message with Ronal (more info)  Text page via [facility] Paging/Directory   ______________________________________________________________________    Interval History   She " had another episode of abdominal pain followed by blood on her toilet paper. She still has not had a bowel movement. Taking tylenol for pain, is not interested in oxycodone currently. Is able to eat, but now has a decreased appetite. Has hot flash and chills during examination.     Physical Exam   Vital Signs: Temp: 98.4  F (36.9  C) Temp src: Tympanic BP: (!) 144/59 Pulse: 67   Resp: 16 SpO2: 98 % O2 Device: None (Room air)    Weight: 182 lbs 11.2 oz    GENERAL: Comfortable, talkative, in no apparent distress.  HEENT: Anicteric, non-injected sclera, mouth moist.   NECK: No JVD.  CARDIOVASCULAR: regular rate and rhythm, no murmur, rub, or gallop. No lower extremity edema   RESPIRATORY: Clear to auscultation bilaterally, no wheezes, no crackles.  GI: Distended, tympanic on percussion,  tenderness to palpation to RUQ and suprapubic area, tenderness to deep palpation to LLQ, +rebound tenderness to LLQ, bowel sounds present   SKIN: No rashes, sores.   NEUROLOGY: Alert and oriented x3, follows commands, speech and language normal  MSK: point tenderness along left mid-sternal border without radiation. No surrounding erythema or edema.      Medical Decision Making       45 MINUTES SPENT BY ME on the date of service doing chart review, history, exam, documentation & further activities per the note.      Data     I have personally reviewed the following data over the past 24 hrs:    6.5  \   10.4 (L)   / 258     143 109 (H) 6.9 (L) /  95   3.8 23 0.71 \     ALT: 23 AST: 19 AP: 78 TBILI: 0.5   ALB: 4.0 TOT PROTEIN: 6.6 LIPASE: N/A     Procal: N/A CRP: 40.81 (H) Lactic Acid: N/A         Imaging results reviewed over the past 24 hrs:   No results found for this or any previous visit (from the past 24 hour(s)).

## 2024-05-29 NOTE — PLAN OF CARE
Goal Outcome Evaluation:      Plan of Care Reviewed With: patient    Overall Patient Progress: no changeOverall Patient Progress: no change       Patient reports increase in stomach pain. Abdomen is distended and firm. Pain with palpation in the left quadrants. Passing gas, had bloody smear on toilet paper, no BM.

## 2024-05-30 VITALS
DIASTOLIC BLOOD PRESSURE: 48 MMHG | BODY MASS INDEX: 27.28 KG/M2 | HEIGHT: 68 IN | TEMPERATURE: 97.2 F | RESPIRATION RATE: 16 BRPM | SYSTOLIC BLOOD PRESSURE: 138 MMHG | HEART RATE: 59 BPM | OXYGEN SATURATION: 94 % | WEIGHT: 180 LBS

## 2024-05-30 LAB
ANION GAP SERPL CALCULATED.3IONS-SCNC: 10 MMOL/L (ref 7–15)
ATRIAL RATE - MUSE: 67 BPM
BUN SERPL-MCNC: 9.1 MG/DL (ref 8–23)
CALCIUM SERPL-MCNC: 9.7 MG/DL (ref 8.8–10.2)
CHLORIDE SERPL-SCNC: 108 MMOL/L (ref 98–107)
CREAT SERPL-MCNC: 0.73 MG/DL (ref 0.51–0.95)
CRP SERPL-MCNC: 24.72 MG/L
DEPRECATED HCO3 PLAS-SCNC: 23 MMOL/L (ref 22–29)
DIASTOLIC BLOOD PRESSURE - MUSE: NORMAL MMHG
EGFRCR SERPLBLD CKD-EPI 2021: 86 ML/MIN/1.73M2
ERYTHROCYTE [DISTWIDTH] IN BLOOD BY AUTOMATED COUNT: 12.1 % (ref 10–15)
GLUCOSE SERPL-MCNC: 108 MG/DL (ref 70–99)
HCT VFR BLD AUTO: 34 % (ref 35–47)
HGB BLD-MCNC: 11 G/DL (ref 11.7–15.7)
HOLD SPECIMEN: NORMAL
HOLD SPECIMEN: NORMAL
INTERPRETATION ECG - MUSE: NORMAL
MAGNESIUM SERPL-MCNC: 2.3 MG/DL (ref 1.7–2.3)
MCH RBC QN AUTO: 29.1 PG (ref 26.5–33)
MCHC RBC AUTO-ENTMCNC: 32.4 G/DL (ref 31.5–36.5)
MCV RBC AUTO: 90 FL (ref 78–100)
P AXIS - MUSE: 68 DEGREES
PLATELET # BLD AUTO: 277 10E3/UL (ref 150–450)
POTASSIUM SERPL-SCNC: 3.8 MMOL/L (ref 3.4–5.3)
PR INTERVAL - MUSE: 236 MS
QRS DURATION - MUSE: 154 MS
QT - MUSE: 440 MS
QTC - MUSE: 464 MS
R AXIS - MUSE: 4 DEGREES
RBC # BLD AUTO: 3.78 10E6/UL (ref 3.8–5.2)
SODIUM SERPL-SCNC: 141 MMOL/L (ref 135–145)
SYSTOLIC BLOOD PRESSURE - MUSE: NORMAL MMHG
T AXIS - MUSE: 22 DEGREES
VENTRICULAR RATE- MUSE: 67 BPM
WBC # BLD AUTO: 5.5 10E3/UL (ref 4–11)

## 2024-05-30 PROCEDURE — 83735 ASSAY OF MAGNESIUM: CPT | Performed by: INTERNAL MEDICINE

## 2024-05-30 PROCEDURE — 80048 BASIC METABOLIC PNL TOTAL CA: CPT | Performed by: INTERNAL MEDICINE

## 2024-05-30 PROCEDURE — 250N000013 HC RX MED GY IP 250 OP 250 PS 637: Performed by: INTERNAL MEDICINE

## 2024-05-30 PROCEDURE — 99239 HOSP IP/OBS DSCHRG MGMT >30: CPT | Performed by: INTERNAL MEDICINE

## 2024-05-30 PROCEDURE — 36415 COLL VENOUS BLD VENIPUNCTURE: CPT | Performed by: INTERNAL MEDICINE

## 2024-05-30 PROCEDURE — 86140 C-REACTIVE PROTEIN: CPT | Performed by: INTERNAL MEDICINE

## 2024-05-30 PROCEDURE — 258N000003 HC RX IP 258 OP 636: Performed by: INTERNAL MEDICINE

## 2024-05-30 PROCEDURE — 250N000011 HC RX IP 250 OP 636: Performed by: INTERNAL MEDICINE

## 2024-05-30 PROCEDURE — 85041 AUTOMATED RBC COUNT: CPT | Performed by: INTERNAL MEDICINE

## 2024-05-30 RX ADMIN — PIPERACILLIN AND TAZOBACTAM 3.38 G: 3; .375 INJECTION, POWDER, LYOPHILIZED, FOR SOLUTION INTRAVENOUS at 02:43

## 2024-05-30 RX ADMIN — ACETAMINOPHEN 650 MG: 325 TABLET, FILM COATED ORAL at 03:44

## 2024-05-30 RX ADMIN — SODIUM CHLORIDE: 9 INJECTION, SOLUTION INTRAVENOUS at 05:06

## 2024-05-30 ASSESSMENT — ACTIVITIES OF DAILY LIVING (ADL)
ADLS_ACUITY_SCORE: 25

## 2024-05-30 NOTE — PLAN OF CARE
SAFETY CHECKLIST  ID Bands and Risk clasps correct and in place (DNR, Fall risk, Allergy, Latex, Limb):  Yes  All Lines Reconciled and labeled correctly: Yes  Whiteboard updated:Yes  Environmental interventions: Yes  Verify Tele #: N/A    Albina Jenkins RN .......  5/30/2024  7:24 AM

## 2024-05-30 NOTE — PLAN OF CARE
A&Ox4. VSS, afebrile. Denies pain. No BM this morning. Abdomen is soft, tender to LLQ and LUQ but improving per patient. BS hypoactive. Plan for patient to discharge home today. Ambulating independently in room.     Temp: 97.2  F (36.2  C) Temp src: Tympanic BP: 138/48 Pulse: 59   Resp: 16 SpO2: 94 % O2 Device: None (Room air)      Albina Jenkins RN .......  5/30/2024  10:58 AM      Goal Outcome Evaluation:      Plan of Care Reviewed With: patient    Overall Patient Progress: improving    Outcome Evaluation: VSS, afebrile, SBA in room, IV infusing NS, IV antibiotics, advanced to regular diet

## 2024-05-30 NOTE — PHARMACY - DISCHARGE MEDICATION RECONCILIATION AND EDUCATION
Pharmacy:  Discharge Counseling and Medication Reconciliation    Sammie Heaton  [id number]  [facility] [address]  [phone] (home)   74 year old female  PCP: No Ref-Primary, Physician    Allergies: Codeine and Sulfa antibiotics    Discharge Counseling:    Pharmacist met with patient (and/or family) today to review the medication portion of the After Visit Summary (with an emphasis on NEW medications) and to address patient's questions/concerns.    Summary of Education: Patient has no questions at this time. No consultation needed at this time.    Materials Provided:  MedCounselor sheets printed from Clinical Pharmacology on: n/a    Discharge Medication Reconciliation:    It has been determined that the patient has an adequate supply of medications available or which can be obtained from the patient's preferred pharmacy, which HE/SHE has confirmed as: [facility] [An updated medication list will be faxed to the patient's pharmacy.]    Thank you for the consult.    Harlan Mustafa RP........May 30, 2024 10:08 AM

## 2024-05-30 NOTE — PLAN OF CARE
[facility] [facility] DISCHARGE NOTE    Patient discharged to home at 11:20 AM via wheel chair. Accompanied by spouse and staff. Discharge instructions reviewed with patient and spouse, opportunity offered to ask questions. Prescriptions - None ordered for discharge. All belongings sent with patient. All questions answered prior to discharge.     Albina Jenkins RN

## 2024-05-30 NOTE — PLAN OF CARE
Goal Outcome Evaluation:      Plan of Care Reviewed With: patient    Overall Patient Progress: improvingOverall Patient Progress: improving         Patient still has not had a BM, Abdomen is soft, tender in lower quadrrants upon palpation, bowel sounds are hypoactive. PRN tylenol and Oxycodone for discomfort.

## 2024-05-30 NOTE — DISCHARGE SUMMARY
"[facility]  Hospitalist Discharge Summary      Date of Admission:  5/27/2024  Date of Discharge:  5/30/2024  Discharging Provider: Henrik Johnson MD  Discharge Service: Hospitalist Service    Discharge Diagnoses   Principal Problem:    Acute colitis  Active Problems:    Venous stasis ulcers of both lower extremities (H)    Essential hypertension    Hyperlipidemia    Malignant neoplasm of female breast (H)    Pancreatic calcification       Clinically Significant Risk Factors     # Overweight: Estimated body mass index is 27.37 kg/m  as calculated from the following:    Height as of this encounter: 1.727 m (5' 8\").    Weight as of this encounter: 81.6 kg (180 lb).       Follow-ups Needed After Discharge   Follow-up Appointments     Follow-up and recommended labs and tests       Follow up with Dr. Dietz on 6/5 at 240 at [facility]            Discharge Disposition   Discharged to home  Condition at discharge: Stable    Hospital Course   Sammie Heaton is a 74 year old female admitted on 5/27/2024. She has a history of HTN and chronic venous stasis and is admitted for colitis     Assessment & Plan  Principal Problem:    Acute colitis    Assessment: present on admission. Differential diagnosis includes ischemia (perfusion vs. Vasculitis) vs infection vs. inflammatory. Does not have diagnosed a fib or PAD, however could have had embolic event from DVT/PFO or a fib. She does have venous insufficiency s/p iliac vein stent. Aortic calcifications on CT of the abdomen and pelvis. No free air. Lactic acid WNL. Infectious cause possible-recent (May 2024) antibiotic use for urinary tract infection. Ate a potato salad that she has been intolerant of in the distant past, but no other family members ill. Possibly UC with colitis located in left descending and sigmoid colon, likely not Crohn's due to age and colitis location. Patient slowly improved, no pain and tolerating a regular diet. No " diarrhea. Doubt infectious cause. Consider ischemic reason. No atrial fibrillation and echo showed no pfo or thrombus. Would benefit from a colonoscopy in next 6 weeks.      Active Problems:    Venous stasis ulcers of both lower extremities (H)    Assessment: chronic, follows with [place]         Essential hypertension    Assessment: chronic    Plan: continue losartan and amlodipine       Hyperlipidemia    Assessment: chronic    Plan: continue crestor       Malignant neoplasm of female breast (H)    Assessment: Hx of BRCA 1 s/p bilateral mastectomy in 2019; s/p hysterectomy with left salpingo-oophorectomy in 1987 and right oophorectomy in 2014       Pancreatic calcification    Assessment: present on admission, not seen on prior 2017 CT (per report). Lipase within normal limits. Needs follow up scan in 3 months or further evaluation by GI.       Consultations This Hospital Stay   None    Code Status   No CPR- Do NOT Intubate    Time Spent on this Encounter   I, Henrik Johnson MD, personally saw the patient today and spent greater than 30 minutes discharging this patient.       Henrik Johnson MD  [facility]  [address]  [place] [address]  Phone: [phone]  Fax: [phone]  ______________________________________________________________________    Physical Exam   Vital Signs: Temp: 97.2  F (36.2  C) Temp src: Tympanic BP: 138/48 Pulse: 59   Resp: 16 SpO2: 94 % O2 Device: None (Room air)    Weight: 180 lbs 0 oz  GENERAL: Comfortable, no apparent distress.  CARDIOVASCULAR: regular rate and rhythm, no murmur. No lower extremity edema   RESPIRATORY: Clear to auscultation bilaterally, no wheezes or crackles.  GI: non-tender, distended, normal bowel sounds.   SKIN: warm periphery, no rashes         Primary Care Physician   Physician No Ref-Primary    Discharge Orders      Reason for your hospital stay    colitis     Follow-up and recommended labs and tests     Follow up with   J Luis on  at 240 at [facility]     Activity    Your activity upon discharge: activity as tolerated     Diet    Follow this diet upon discharge: Orders Placed This Encounter      Regular Diet Adult       Significant Results and Procedures   Most Recent 3 CBC's:  Recent Labs   Lab Test 24  0553 24  0553 24  0545   WBC 5.5 6.5 7.7   HGB 11.0* 10.4* 10.6*   MCV 90 89 88    258 268     Most Recent 3 BMP's:  Recent Labs   Lab Test 24  0553 24  0553 24  0545    143 140   POTASSIUM 3.8 3.8 4.1   CHLORIDE 108* 109* 108*   CO2 23 23 22   BUN 9.1 6.9* 7.1*   CR 0.73 0.71 0.63   ANIONGAP 10 11 10   BERNARDO 9.7 9.2 9.7   * 95 104*     Most Recent 2 LFT's:  Recent Labs   Lab Test 24  0553 24  0710   AST 19 20   ALT 23 26   ALKPHOS 78 79   BILITOTAL 0.5 0.5     Recent Results (from the past 4320 hour(s))   Echocardiogram Complete   Result Value    LVEF  60-65%    Narrative    [id number]  [id number]  [id number]  [id number]^DHAVAL^DICK^L     [facility]  [address] [place] [address]     Name: YAW CAREY  MRN: [id number]  : 1950  Study Date: 2024 01:17 PM  Age: 74 yrs  Gender: Female  Patient Location: [facility]  Reason For Study: Arterial Embolism and Thrombosis  Ordering Physician: DICK PUENTES  Performed By: Leah Berry, LC, RDCS, RVT     BSA: 2.0 m2  Height: 68 in  Weight: 182 lb  HR: 73  BP: 144/59 mmHg  ______________________________________________________________________________  Procedure  Complete Portable Echo Adult. Contrast Definity. Bubble Echocardiogram with  two-dimensional, color and spectral Doppler performed. Echocardiogram with  two-dimensional, color and spectral Doppler performed. Definity (NDC #55930-  011-16) given intravenously. Patient was given 1ml mixture of 1.5ml Definity  and 8.5ml saline. 9 ml wasted. Definity Lot # [id number]  .  ______________________________________________________________________________  Interpretation Summary  Global and regional left ventricular function is normal with an EF of 60-65%.  Global right ventricular function is normal.  Mitral leaflet thickness is increased . Mild to moderate mitral insufficiency  is present.  No pericardial effusion is present.  ______________________________________________________________________________  Left Ventricle  Left ventricular size is normal. Left ventricular wall thickness is normal.  Global and regional left ventricular function is normal with an EF of 60-65%.     Right Ventricle  The right ventricle is normal size. Global right ventricular function is  normal.     Atria  Both atria appear normal.     Mitral Valve  Mitral leaflet thickness is increased . Mild to moderate mitral insufficiency  is present.     Aortic Valve  Aortic valve is normal in structure and function.     Tricuspid Valve  The tricuspid valve is normal. Trace tricuspid insufficiency is present. The  right ventricular systolic pressure is approximated at 29.2 mmHg plus the  right atrial pressure. Pulmonary artery systolic pressure is normal.     Pulmonic Valve  The pulmonic valve is normal.     Vessels  The aorta root is normal. The inferior vena cava cannot be assessed.     Pericardium  No pericardial effusion is present.     ______________________________________________________________________________  MMode/2D Measurements & Calculations  IVSd: 0.93 cm  LVIDd: 5.3 cm  LVIDs: 3.5 cm  LVPWd: 0.96 cm  FS: 33.8 %  LV mass(C)d: 185.0 grams  LV mass(C)dI: 94.2 grams/m2  Ao root diam: 3.1 cm  asc Aorta Diam: 3.2 cm     LVOT diam: 2.1 cm  LVOT area: 3.5 cm2  Ao root diam index Ht(cm/m): 1.8  Ao root diam index BSA (cm/m2): 1.6  Asc Ao diam index BSA (cm/m2): 1.6  Asc Ao diam index Ht(cm/m): 1.9  LA Volume (BP): 46.1 ml  LA Volume Index (BP): 23.5 ml/m2  RWT: 0.36  TAPSE: 2.1 cm     Doppler Measurements &  Calculations  MV E max xavi: 119.0 cm/sec  MV A max xavi: 96.8 cm/sec  MV E/A: 1.2  MV dec time: 0.19 sec  Ao V2 max: 138.0 cm/sec  Ao max P.0 mmHg  Ao V2 mean: 95.8 cm/sec  Ao mean P.0 mmHg  Ao V2 VTI: 26.9 cm  RADHA(I,D): 3.1 cm2  RADHA(V,D): 3.0 cm2  LV V1 max P.7 mmHg  LV V1 max: 119.0 cm/sec  LV V1 VTI: 23.6 cm  SV(LVOT): 82.4 ml  SI(LVOT): 42.0 ml/m2  PA acc time: 0.14 sec  TR max xavi: 270.1 cm/sec  TR max P.2 mmHg  AV Xavi Ratio (DI): 0.86  RADHA Index (cm2/m2): 1.6  E/E' av.4  Lateral E/e': 9.3  Medial E/e': 13.5  RV S Xavi: 15.8 cm/sec     ______________________________________________________________________________  Report approved by: Phong Dorsey 2024 04:10 PM            Exam: CT Abdomen And Pelvis With Contrast   Exam date and time: 2024 3:13 AM   Age: 74 years old   Clinical indication: Nausea and other: Diarrhea; Additional info: Rectal   bleeding and abdominal pain      TECHNIQUE:   Imaging protocol: Computed tomography of the abdomen and pelvis with contrast.   Radiation optimization: All CT scans at this facility use at least one of these   dose optimization techniques: automated exposure control; mA and/or kV   adjustment per patient size (includes targeted exams where dose is matched to   clinical indication); or iterative reconstruction.   Contrast material: ISOVUE-370; Contrast volume: 104 ml; Contrast route:   INTRAVENOUS (IV);       COMPARISON:   No relevant prior studies available.      FINDINGS:   Tubes, catheters and devices: Unremarkable.       Diaphragm: Moderately elevated diaphragms bilaterally.      Liver: Moderate enlargement of the left hepatic lobe and hepatomegaly.   Correlate with LFTs.   Gallbladder and bile ducts: Multiple calcified gallstones are present.   Pancreas: Moderate multifocal cystic changes of the head of the pancreas   etiology indeterminate.   Spleen: Unremarkable. No splenomegaly.   Adrenal glands: Normal. No mass.   Kidneys and  ureters: Moderate urinary retention.   Stomach and bowel: There is moderate thickening of the rectosigmoid colon there   is advanced left colitis from the splenic flexure onwards.   Appendix: No evidence of appendicitis.      Intraperitoneal space: Unremarkable. No free air. No significant fluid   collection.   Vasculature: The aorta demonstrates moderate atherosclerotic calcification.   Descend indwelling left iliac external iliac vein and common femoral vein   stent. Mild stenosis origin of the celiac trunk.   Lymph nodes: Small periaortic lymph nodes.   Urinary bladder: Unremarkable as visualized.   Reproductive: Unremarkable as visualized.   Bones/joints: Unremarkable. No acute fracture.   Soft tissues: Unremarkable.                                                                       IMPRESSION:   1.   Severe colitis left colon and rectosigmoid colon possible infectious   inflammatory colitis or other source.   2.   Moderate urinary retention correlate with urinalysis.   3.   Heterogeneous and atrophy or microcystic changes noted of the head of the   pancreas correlate with serum lipase level and or MRCP. Consider GI   consultation follow-up.   4.   Extensive cholelithiasis. No acute cholecystitis.   5.   5 per incidental iliac venous stent appears patent.      THIS DOCUMENT HAS BEEN ELECTRONICALLY SIGNED BY BOZENA IBARRA MD    Discharge Medications   Current Discharge Medication List        CONTINUE these medications which have NOT CHANGED    Details   amLODIPine (NORVASC) 10 MG tablet Take 10 mg by mouth daily      Ascorbic Acid (VITAMIN C PO) Take 500 mg by mouth 2 times daily      aspirin 81 MG tablet Take 1 tablet by mouth daily.      betamethasone dipropionate (DIPROSONE) 0.05 % external cream Apply topically daily as needed (rash)      Biotin-Vitamin C (HAIR SKIN NAILS GUMMIES PO) Take 2 Pieces by mouth every morning      carboxymethylcellulose (CARBOXYMETHYLCELLULOSE SODIUM) 0.5 % SOLN ophthalmic  solution Apply 1 drop to eye 2 times daily      Collagen-Vitamin C (GNP COLLAGEN PLUS VITAMIN C PO) Take 1 capsule by mouth every morning      Cranberry-Vitamin C-Probiotic (AZO CRANBERRY PO) Take 2 capsules by mouth every morning      donepezil (ARICEPT) 10 MG tablet Take 10 mg by mouth every morning      fexofenadine (ALLEGRA) 180 MG tablet Take 180 mg by mouth at bedtime      fish oil-omega-3 fatty acids 1000 MG capsule Take 2 g by mouth daily      GARLIC PO Take 1,000 mg by mouth daily      glucosamine 500 MG CAPS capsule Take 500 mg by mouth 2 times daily      losartan (COZAAR) 100 MG tablet Take 100 mg by mouth daily      Multiple Vitamins-Minerals (MULTIVITAMIN GUMMIES ADULT PO) Take 2 Pieces by mouth daily      psyllium (METAMUCIL/KONSYL) Packet Take 1 packet by mouth 3 times daily      simvastatin (ZOCOR) 10 MG tablet Take 10 mg by mouth at bedtime      Turmeric Curcumin 500 MG CAPS Take 1 capsule by mouth daily      UNABLE TO FIND Take by mouth 2 times daily MEDICATION NAME: Bone maximizer 3      Lactobacillus (ACIDOPHILUS PROBIOTIC PO)            STOP taking these medications       cetirizine (ZYRTEC) 10 MG tablet Comments:   Reason for Stopping:         cyanocobalamin (VITAMIN B-12) 1000 MCG tablet Comments:   Reason for Stopping:         rosuvastatin (CRESTOR) 5 MG tablet Comments:   Reason for Stopping:             Allergies   Allergies   Allergen Reactions    Codeine GI Disturbance     constipation    Sulfa Antibiotics Rash     Flush; sulfonamide antibiotics

## 2024-05-31 ENCOUNTER — PATIENT OUTREACH (OUTPATIENT)
Dept: FAMILY MEDICINE | Facility: OTHER | Age: 74
End: 2024-05-31
Payer: COMMERCIAL

## 2024-05-31 NOTE — TELEPHONE ENCOUNTER
Patient has PCP elsewhere, no follow-up here. No TCM call required per policy.  Saba Salas RN on 5/31/2024 at 8:36 AM

## 2024-08-05 ENCOUNTER — HOSPITAL ENCOUNTER (EMERGENCY)
Facility: OTHER | Age: 74
Discharge: HOME | End: 2024-08-05
Attending: STUDENT IN AN ORGANIZED HEALTH CARE EDUCATION/TRAINING PROGRAM | Admitting: STUDENT IN AN ORGANIZED HEALTH CARE EDUCATION/TRAINING PROGRAM
Payer: COMMERCIAL

## 2024-08-05 ENCOUNTER — APPOINTMENT (OUTPATIENT)
Dept: CT IMAGING | Facility: OTHER | Age: 74
End: 2024-08-05
Attending: STUDENT IN AN ORGANIZED HEALTH CARE EDUCATION/TRAINING PROGRAM
Payer: COMMERCIAL

## 2024-08-05 VITALS
DIASTOLIC BLOOD PRESSURE: 93 MMHG | SYSTOLIC BLOOD PRESSURE: 139 MMHG | RESPIRATION RATE: 16 BRPM | TEMPERATURE: 97.1 F | HEART RATE: 56 BPM | OXYGEN SATURATION: 97 %

## 2024-08-05 DIAGNOSIS — R10.9 LEFT SIDED ABDOMINAL PAIN: ICD-10-CM

## 2024-08-05 DIAGNOSIS — K91.89: ICD-10-CM

## 2024-08-05 LAB
ALBUMIN SERPL BCG-MCNC: 5.2 G/DL (ref 3.5–5.2)
ALBUMIN UR-MCNC: NEGATIVE MG/DL
ALP SERPL-CCNC: 101 U/L (ref 40–150)
ALT SERPL W P-5'-P-CCNC: 45 U/L (ref 0–50)
ANION GAP SERPL CALCULATED.3IONS-SCNC: 12 MMOL/L (ref 7–15)
APPEARANCE UR: CLEAR
AST SERPL W P-5'-P-CCNC: 30 U/L (ref 0–45)
BASOPHILS # BLD AUTO: 0.1 10E3/UL (ref 0–0.2)
BASOPHILS NFR BLD AUTO: 1 %
BILIRUB SERPL-MCNC: 0.5 MG/DL
BILIRUB UR QL STRIP: NEGATIVE
BUN SERPL-MCNC: 13.1 MG/DL (ref 8–23)
CALCIUM SERPL-MCNC: 11.4 MG/DL (ref 8.8–10.4)
CHLORIDE SERPL-SCNC: 104 MMOL/L (ref 98–107)
COLOR UR AUTO: YELLOW
CREAT SERPL-MCNC: 0.62 MG/DL (ref 0.51–0.95)
EGFRCR SERPLBLD CKD-EPI 2021: >90 ML/MIN/1.73M2
EOSINOPHIL # BLD AUTO: 0.3 10E3/UL (ref 0–0.7)
EOSINOPHIL NFR BLD AUTO: 3 %
ERYTHROCYTE [DISTWIDTH] IN BLOOD BY AUTOMATED COUNT: 12.2 % (ref 10–15)
GLUCOSE SERPL-MCNC: 97 MG/DL (ref 70–99)
GLUCOSE UR STRIP-MCNC: NEGATIVE MG/DL
HCO3 SERPL-SCNC: 25 MMOL/L (ref 22–29)
HCT VFR BLD AUTO: 40.7 % (ref 35–47)
HGB BLD-MCNC: 13.6 G/DL (ref 11.7–15.7)
HGB UR QL STRIP: NEGATIVE
HOLD SPECIMEN: NORMAL
IMM GRANULOCYTES # BLD: 0 10E3/UL
IMM GRANULOCYTES NFR BLD: 0 %
KETONES UR STRIP-MCNC: NEGATIVE MG/DL
LACTATE SERPL-SCNC: 1.2 MMOL/L (ref 0.7–2)
LEUKOCYTE ESTERASE UR QL STRIP: NEGATIVE
LIPASE SERPL-CCNC: 45 U/L (ref 13–60)
LYMPHOCYTES # BLD AUTO: 3 10E3/UL (ref 0.8–5.3)
LYMPHOCYTES NFR BLD AUTO: 38 %
MCH RBC QN AUTO: 29.7 PG (ref 26.5–33)
MCHC RBC AUTO-ENTMCNC: 33.4 G/DL (ref 31.5–36.5)
MCV RBC AUTO: 89 FL (ref 78–100)
MONOCYTES # BLD AUTO: 0.5 10E3/UL (ref 0–1.3)
MONOCYTES NFR BLD AUTO: 7 %
NEUTROPHILS # BLD AUTO: 4 10E3/UL (ref 1.6–8.3)
NEUTROPHILS NFR BLD AUTO: 51 %
NITRATE UR QL: NEGATIVE
NRBC # BLD AUTO: 0 10E3/UL
NRBC BLD AUTO-RTO: 0 /100
PH UR STRIP: 6 [PH] (ref 5–9)
PLATELET # BLD AUTO: 339 10E3/UL (ref 150–450)
POTASSIUM SERPL-SCNC: 3.9 MMOL/L (ref 3.4–5.3)
PROT SERPL-MCNC: 8.6 G/DL (ref 6.4–8.3)
RBC # BLD AUTO: 4.58 10E6/UL (ref 3.8–5.2)
RBC URINE: 1 /HPF
SODIUM SERPL-SCNC: 141 MMOL/L (ref 135–145)
SP GR UR STRIP: 1.01 (ref 1–1.03)
UROBILINOGEN UR STRIP-MCNC: NORMAL MG/DL
WBC # BLD AUTO: 7.9 10E3/UL (ref 4–11)
WBC URINE: 0 /HPF

## 2024-08-05 PROCEDURE — 83690 ASSAY OF LIPASE: CPT | Performed by: STUDENT IN AN ORGANIZED HEALTH CARE EDUCATION/TRAINING PROGRAM

## 2024-08-05 PROCEDURE — 74177 CT ABD & PELVIS W/CONTRAST: CPT

## 2024-08-05 PROCEDURE — 36415 COLL VENOUS BLD VENIPUNCTURE: CPT | Performed by: STUDENT IN AN ORGANIZED HEALTH CARE EDUCATION/TRAINING PROGRAM

## 2024-08-05 PROCEDURE — 250N000011 HC RX IP 250 OP 636: Performed by: STUDENT IN AN ORGANIZED HEALTH CARE EDUCATION/TRAINING PROGRAM

## 2024-08-05 PROCEDURE — 96376 TX/PRO/DX INJ SAME DRUG ADON: CPT

## 2024-08-05 PROCEDURE — 96374 THER/PROPH/DIAG INJ IV PUSH: CPT | Mod: XU | Performed by: STUDENT IN AN ORGANIZED HEALTH CARE EDUCATION/TRAINING PROGRAM

## 2024-08-05 PROCEDURE — 96376 TX/PRO/DX INJ SAME DRUG ADON: CPT | Performed by: STUDENT IN AN ORGANIZED HEALTH CARE EDUCATION/TRAINING PROGRAM

## 2024-08-05 PROCEDURE — 81001 URINALYSIS AUTO W/SCOPE: CPT | Performed by: STUDENT IN AN ORGANIZED HEALTH CARE EDUCATION/TRAINING PROGRAM

## 2024-08-05 PROCEDURE — 80053 COMPREHEN METABOLIC PANEL: CPT | Performed by: STUDENT IN AN ORGANIZED HEALTH CARE EDUCATION/TRAINING PROGRAM

## 2024-08-05 PROCEDURE — 99285 EMERGENCY DEPT VISIT HI MDM: CPT | Mod: 25 | Performed by: STUDENT IN AN ORGANIZED HEALTH CARE EDUCATION/TRAINING PROGRAM

## 2024-08-05 PROCEDURE — 99285 EMERGENCY DEPT VISIT HI MDM: CPT | Mod: 25

## 2024-08-05 PROCEDURE — 99285 EMERGENCY DEPT VISIT HI MDM: CPT | Performed by: STUDENT IN AN ORGANIZED HEALTH CARE EDUCATION/TRAINING PROGRAM

## 2024-08-05 PROCEDURE — 96374 THER/PROPH/DIAG INJ IV PUSH: CPT | Mod: XU

## 2024-08-05 PROCEDURE — 83605 ASSAY OF LACTIC ACID: CPT | Performed by: STUDENT IN AN ORGANIZED HEALTH CARE EDUCATION/TRAINING PROGRAM

## 2024-08-05 PROCEDURE — 250N000009 HC RX 250: Performed by: STUDENT IN AN ORGANIZED HEALTH CARE EDUCATION/TRAINING PROGRAM

## 2024-08-05 PROCEDURE — 96375 TX/PRO/DX INJ NEW DRUG ADDON: CPT | Performed by: STUDENT IN AN ORGANIZED HEALTH CARE EDUCATION/TRAINING PROGRAM

## 2024-08-05 PROCEDURE — 85025 COMPLETE CBC W/AUTO DIFF WBC: CPT | Performed by: STUDENT IN AN ORGANIZED HEALTH CARE EDUCATION/TRAINING PROGRAM

## 2024-08-05 PROCEDURE — 96375 TX/PRO/DX INJ NEW DRUG ADDON: CPT

## 2024-08-05 RX ORDER — ONDANSETRON 2 MG/ML
4 INJECTION INTRAMUSCULAR; INTRAVENOUS ONCE
Status: COMPLETED | OUTPATIENT
Start: 2024-08-05 | End: 2024-08-05

## 2024-08-05 RX ORDER — HYDROMORPHONE HYDROCHLORIDE 1 MG/ML
0.3 INJECTION, SOLUTION INTRAMUSCULAR; INTRAVENOUS; SUBCUTANEOUS ONCE
Status: DISCONTINUED | OUTPATIENT
Start: 2024-08-05 | End: 2024-08-05

## 2024-08-05 RX ORDER — HYDROMORPHONE HYDROCHLORIDE 1 MG/ML
0.3 INJECTION, SOLUTION INTRAMUSCULAR; INTRAVENOUS; SUBCUTANEOUS ONCE
Status: COMPLETED | OUTPATIENT
Start: 2024-08-05 | End: 2024-08-05

## 2024-08-05 RX ORDER — OXYCODONE AND ACETAMINOPHEN 5; 325 MG/1; MG/1
1 TABLET ORAL EVERY 6 HOURS PRN
Qty: 12 TABLET | Refills: 0 | Status: SHIPPED | OUTPATIENT
Start: 2024-08-05

## 2024-08-05 RX ORDER — IOPAMIDOL 755 MG/ML
104 INJECTION, SOLUTION INTRAVASCULAR ONCE
Status: COMPLETED | OUTPATIENT
Start: 2024-08-05 | End: 2024-08-05

## 2024-08-05 RX ADMIN — SODIUM CHLORIDE 60 ML: 9 INJECTION, SOLUTION INTRAVENOUS at 17:38

## 2024-08-05 RX ADMIN — ONDANSETRON 4 MG: 2 INJECTION INTRAMUSCULAR; INTRAVENOUS at 17:51

## 2024-08-05 RX ADMIN — HYDROMORPHONE HYDROCHLORIDE 0.3 MG: 1 INJECTION, SOLUTION INTRAMUSCULAR; INTRAVENOUS; SUBCUTANEOUS at 17:51

## 2024-08-05 RX ADMIN — IOPAMIDOL 104 ML: 755 INJECTION, SOLUTION INTRAVENOUS at 17:38

## 2024-08-05 RX ADMIN — HYDROMORPHONE HYDROCHLORIDE 0.3 MG: 1 INJECTION, SOLUTION INTRAMUSCULAR; INTRAVENOUS; SUBCUTANEOUS at 19:29

## 2024-08-05 ASSESSMENT — ACTIVITIES OF DAILY LIVING (ADL)
ADLS_ACUITY_SCORE: 38

## 2024-08-05 ASSESSMENT — COLUMBIA-SUICIDE SEVERITY RATING SCALE - C-SSRS
1. IN THE PAST MONTH, HAVE YOU WISHED YOU WERE DEAD OR WISHED YOU COULD GO TO SLEEP AND NOT WAKE UP?: NO
2. HAVE YOU ACTUALLY HAD ANY THOUGHTS OF KILLING YOURSELF IN THE PAST MONTH?: NO
6. HAVE YOU EVER DONE ANYTHING, STARTED TO DO ANYTHING, OR PREPARED TO DO ANYTHING TO END YOUR LIFE?: NO

## 2024-08-05 NOTE — ED TRIAGE NOTES
Pt arrives via private car with complaints of 8/10 left sided abdominal pain following a colonoscopy on 8/1/24. Pt stated they were unable to complete the procedure due to a blockage, she woke up the next morning in pain and it has not gone away since. Pt was in clinic this morning and they advised her to come over here for possible perforation.      Triage Assessment (Adult)       Row Name 08/05/24 [id number]          Triage Assessment    Airway WDL WDL        Respiratory WDL    Respiratory WDL WDL        Skin Circulation/Temperature WDL    Skin Circulation/Temperature WDL WDL        Cardiac WDL    Cardiac WDL WDL        Peripheral/Neurovascular WDL    Peripheral Neurovascular WDL WDL        Cognitive/Neuro/Behavioral WDL    Cognitive/Neuro/Behavioral WDL WDL

## 2024-08-05 NOTE — ED PROVIDER NOTES
History     Chief Complaint   Patient presents with    Abdominal Pain    Post-op Problem       Sammie Heaton is a 74 year old female who presents with abdominal pain.  Severe left-sided abdominal pain present since immediately after colonoscopy 4 days ago.  She had this done for concern for ischemic colitis diagnosis.  Apparently colonoscopy was incomplete due to blockage with recommended additional tests for further evaluation.  She has had dry heaves and nausea since the onset of pain.  She has been able to keep p.o intake down though.  She has been having stools every time she urinates which exceeds her normal stool pattern prior to the colonoscopy.  Denies fever, chills, vaginal discharge or bleeding, urinary symptoms, blood in stool, diarrhea, bloating.  Pain is severe.  Declines any medications for pain currently.  Abdominal surgical history includes hysterectomy.    Allergies   Allergen Reactions    Codeine GI Disturbance     constipation    Sulfa Antibiotics Rash     Flush; sulfonamide antibiotics       Patient Active Problem List    Diagnosis Date Noted    Acute colitis 05/27/2024     Priority: Medium    Pancreatic calcification 05/27/2024     Priority: Medium    After-cataract with vision obscured 07/14/2020     Priority: Medium    Essential hypertension 06/11/2020     Priority: Medium    Open wound of lower leg 06/11/2020     Priority: Medium    Malignant neoplasm of female breast (H) 05/21/2019     Priority: Medium     Formatting of this note might be different from the original.  Added automatically from request for surgery [id number]      Abnormal mammogram 05/08/2019     Priority: Medium    Arthritis of hand 05/08/2019     Priority: Medium    Headache 05/08/2019     Priority: Medium    Positive test for genetic marker of susceptibility to malignant neoplasm of breast 05/06/2019     Priority: Medium    Peripheral venous insufficiency 04/26/2017     Priority: Medium    General medical exam  04/26/2017     Priority: Medium    Venous stasis ulcers of both lower extremities (H) 06/03/2016     Priority: Medium    ACP (advance care planning) 05/25/2016     Priority: Medium     Advance Care Planning 5/25/2016: ACP Review of Chart / Resources Provided:  Reviewed chart for advance care plan.  Sammie Heaton has no plan or code status on file however states presence of ACP document. Copy requested. Confirmed code status reflects current choices pending receipt of document/advance care plan review.  Confirmed/documented legally designated decision makers.  Added by Cleo Yusuf      Advance Care Planning 6/9/2017: ACP Review of Chart / Resources Provided:  Reviewed chart for advance care plan.  Sammie Heaton has no plan or code status on file however states presence of ACP document. Copy requested.   Added by Luz Ford            Disorder of respiratory system exacerbated by aspirin 05/09/2016     Priority: Medium     Replacing diagnoses that were inactivated after the 10/1/2021 regulatory import.      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     Priority: Medium    Amphetamine user 05/17/2011     Priority: Medium    Idiopathic hypersomnia without long sleep time 05/17/2011     Priority: Medium    Osteopenia 04/18/2011     Priority: Medium    Basal cell papilloma 09/15/2009     Priority: Medium    Hyperlipidemia 09/15/2009     Priority: Medium       Past Medical History:   Diagnosis Date    Abdominal pain, unspecified site 09/19/2001    Cystitis, unspecified 05/12/2003    Dermatophytosis of the body 11/07/2006    Follow-up examination, following unspecified surgery 03/26/2003    Hemorrhage of rectum and anus 10/20/2005    Nonspecific abnormal findings on radiological and 06/27/2000    Osteoporosis, unspecified 07/26/2000    Other abnormal findings on radiological examinatio 03/18/2002    Other screening mammogram 09/11/2001    Routine general medical examination at a health care facility 06/14/2000     Sebaceous cyst 03/10/2003    Unspecified ulcer of lower limb 2003    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) 2003    Venous (peripheral) insufficiency, unspecified 2004       Past Surgical History:   Procedure Laterality Date    BLADDER SURGERY  2017    bladder repair done at [place]    COLONOSCOPY  2005    ENT SURGERY      tonsillectomy    GYN SURGERY      hysterectomy    GYN SURGERY      right ovary removed at [facility]    PHACOEMULSIFICATION WITH STANDARD INTRAOCULAR LENS IMPLANT Left 9/10/2015    Procedure: PHACOEMULSIFICATION WITH STANDARD INTRAOCULAR LENS IMPLANT;  Surgeon: Ray Lucero MD;  Location: [facility] OR    PHACOEMULSIFICATION WITH STANDARD INTRAOCULAR LENS IMPLANT Right 2015    Procedure: PHACOEMULSIFICATION WITH STANDARD INTRAOCULAR LENS IMPLANT;  Surgeon: Ray Lucero MD;  Location: [facility] OR       Family History   Problem Relation Age of Onset    Heart Disease Mother     C.A.D. Mother     Heart Disease Father         MI    Blood Disease Father     Breast Cancer Sister        Social History     Tobacco Use    Smoking status: Former     Current packs/day: 0.00     Average packs/day: 2.0 packs/day for 17.4 years (34.9 ttl pk-yrs)     Types: Cigarettes     Start date: 1966     Quit date: 1983     Years since quittin.1    Smokeless tobacco: Never   Substance Use Topics    Alcohol use: No    Drug use: No       Medications:    oxyCODONE-acetaminophen (PERCOCET) 5-325 MG tablet  amLODIPine (NORVASC) 10 MG tablet  Ascorbic Acid (VITAMIN C PO)  aspirin 81 MG tablet  betamethasone dipropionate (DIPROSONE) 0.05 % external cream  Biotin-Vitamin C (HAIR SKIN NAILS GUMMIES PO)  carboxymethylcellulose (CARBOXYMETHYLCELLULOSE SODIUM) 0.5 % SOLN ophthalmic solution  Collagen-Vitamin C (GNP COLLAGEN PLUS VITAMIN C PO)  Cranberry-Vitamin C-Probiotic (AZO CRANBERRY PO)  donepezil (ARICEPT) 10 MG tablet  fexofenadine (ALLEGRA) 180 MG tablet  fish oil-omega-3  fatty acids 1000 MG capsule  GARLIC PO  glucosamine 500 MG CAPS capsule  Lactobacillus (ACIDOPHILUS PROBIOTIC PO)  losartan (COZAAR) 100 MG tablet  Multiple Vitamins-Minerals (MULTIVITAMIN GUMMIES ADULT PO)  psyllium (METAMUCIL/KONSYL) Packet  simvastatin (ZOCOR) 10 MG tablet  Turmeric Curcumin 500 MG CAPS  UNABLE TO FIND        Review of Systems: See HPI for pertinent negatives and positives. All other systems reviewed and found to be negative.    Physical Exam   BP (!) 139/93   Pulse 56   Temp 97.1  F (36.2  C) (Tympanic)   Resp 16   SpO2 97%      General: awake, comfortable  HEENT: atraumatic  Respiratory: normal effort, clear to auscultation bilaterally  Cardiovascular: regular rate and rhythm, no murmurs  Abdomen: BS+ bilaterally, soft, no distension, left sided tenderness with guarding  Extremities: no deformities, edema, or tenderness  Skin: warm, dry, no rashes  Neuro: alert, no focal deficits  Psych: appropriate mood and affect    ED Course      ED Course as of 08/05/24 2019   Mon Aug 05, 2024   2001 Abdominal pain now resolved.       Results for orders placed or performed during the hospital encounter of 08/05/24 (from the past 24 hour(s))   [place] Draw    Narrative    The following orders were created for panel order [place] Draw.  Procedure                               Abnormality         Status                     ---------                               -----------         ------                     Extra Blue Top Tube[[id number]]                              Final result               Extra Red Top Tube[[id number]]                               Final result               Extra Green Top (Lithium...[[id number]]                      Final result               Extra Purple Top Tube[[id number]]                            Final result               Extra Green Top (Lithium...[[id number]]                      Final result                 Please view results for these tests on the individual orders.   Extra  Blue Top Tube   Result Value Ref Range    Hold Specimen x    Extra Red Top Tube   Result Value Ref Range    Hold Specimen x    Extra Green Top (Lithium Heparin) Tube   Result Value Ref Range    Hold Specimen x    Extra Purple Top Tube   Result Value Ref Range    Hold Specimen x    Extra Green Top (Lithium Heparin) ON ICE   Result Value Ref Range    Hold Specimen x    CBC with platelets differential    Narrative    The following orders were created for panel order CBC with platelets differential.  Procedure                               Abnormality         Status                     ---------                               -----------         ------                     CBC with platelets and d...[[id number]]                      Final result                 Please view results for these tests on the individual orders.   Comprehensive metabolic panel   Result Value Ref Range    Sodium 141 135 - 145 mmol/L    Potassium 3.9 3.4 - 5.3 mmol/L    Carbon Dioxide (CO2) 25 22 - 29 mmol/L    Anion Gap 12 7 - 15 mmol/L    Urea Nitrogen 13.1 8.0 - 23.0 mg/dL    Creatinine 0.62 0.51 - 0.95 mg/dL    GFR Estimate >90 >60 mL/min/1.73m2    Calcium 11.4 (H) 8.8 - 10.4 mg/dL    Chloride 104 98 - 107 mmol/L    Glucose 97 70 - 99 mg/dL    Alkaline Phosphatase 101 40 - 150 U/L    AST 30 0 - 45 U/L    ALT 45 0 - 50 U/L    Protein Total 8.6 (H) 6.4 - 8.3 g/dL    Albumin 5.2 3.5 - 5.2 g/dL    Bilirubin Total 0.5 <=1.2 mg/dL   Lipase   Result Value Ref Range    Lipase 45 13 - 60 U/L   Lactic acid whole blood   Result Value Ref Range    Lactic Acid 1.2 0.7 - 2.0 mmol/L   CBC with platelets and differential   Result Value Ref Range    WBC Count 7.9 4.0 - 11.0 10e3/uL    RBC Count 4.58 3.80 - 5.20 10e6/uL    Hemoglobin 13.6 11.7 - 15.7 g/dL    Hematocrit 40.7 35.0 - 47.0 %    MCV 89 78 - 100 fL    MCH 29.7 26.5 - 33.0 pg    MCHC 33.4 31.5 - 36.5 g/dL    RDW 12.2 10.0 - 15.0 %    Platelet Count 339 150 - 450 10e3/uL    % Neutrophils 51 %    %  Lymphocytes 38 %    % Monocytes 7 %    % Eosinophils 3 %    % Basophils 1 %    % Immature Granulocytes 0 %    NRBCs per 100 WBC 0 <1 /100    Absolute Neutrophils 4.0 1.6 - 8.3 10e3/uL    Absolute Lymphocytes 3.0 0.8 - 5.3 10e3/uL    Absolute Monocytes 0.5 0.0 - 1.3 10e3/uL    Absolute Eosinophils 0.3 0.0 - 0.7 10e3/uL    Absolute Basophils 0.1 0.0 - 0.2 10e3/uL    Absolute Immature Granulocytes 0.0 <=0.4 10e3/uL    Absolute NRBCs 0.0 10e3/uL   CT Abdomen Pelvis w Contrast    Narrative    CT ABDOMEN PELVIS W CONTRAST    CLINICAL HISTORY: Female, age 74 years,  severe left abd pain since  colonoscopy;    Comparison:  CT scan abdomen and pelvis 5/27/2024    TECHNIQUE:  CT scanwas performed of the abdomen and pelvis with IV  contrast. Axial, sagittal and coronal images were reviewed.  This facility minimizes radiation dose by adjusting the mA and/or kV  according to each patient size.  This CT scan was performed using one or more the following dose  reduction techniques:  -Automated exposure control,  -Adjustment of the mA and/or kV according to patient's size, and/or,  -Use of iterative reconstruction technique.      FINDINGS:  The lung bases and visualized portions of the heart demonstrate no  acute abnormality.    Stomach and duodenum: Unremarkable.    Liver: Unchanged hepatic steatosis. No acute abnormality.    Gallbladder: Unchanged cholelithiasis. No CT evidence that would  suggest cholecystitis.    Spleen: Unremarkable.    Anchors: Unremarkable.    Adrenal glands: Unremarkable.    Kidneys: Unremarkable.    Ureters: Unremarkable.    Urinary bladder: Unremarkable.    Large and small bowel: Moderate volume of stool throughout the colon.  No acute abnormality. No evidence of suggest perforation or abscess.  Colitis seen previously has resolved.    The abdominal aorta and inferior vena cava taper normally. Patchy  scattered calcifications are similar in appearance. A stent is again  seen in the left external iliac  vein without acute complication.    Retroperitoneal and mesenteric nodes are normal in size.    Lymph nodes within the left and right groin are mildly numerous,  similar in appearance compared to the prior study.     Bony structures: Unremarkable.        Impression    IMPRESSION:   No acute abnormality.    Chronic changes are similar in appearance compared to prior study as  described above.    MADYSON NEUMANN MD         SYSTEM ID:  [id number]   UA with Microscopic reflex to Culture    Specimen: Urine, Clean Catch   Result Value Ref Range    Color Urine Yellow Colorless, Straw, Light Yellow, Yellow    Appearance Urine Clear Clear    Glucose Urine Negative Negative mg/dL    Bilirubin Urine Negative Negative    Ketones Urine Negative Negative mg/dL    Specific Gravity Urine 1.010 1.000 - 1.030    Blood Urine Negative Negative    pH Urine 6.0 5.0 - 9.0    Protein Albumin Urine Negative Negative mg/dL    Urobilinogen Urine Normal Normal, 2.0 mg/dL    Nitrite Urine Negative Negative    Leukocyte Esterase Urine Negative Negative    RBC Urine 1 <=2 /HPF    WBC Urine 0 <=5 /HPF    Narrative    Urine Culture not indicated       Medications   HYDROmorphone (PF) (DILAUDID) injection 0.3 mg (0.3 mg Intravenous $Given 8/5/24 1751)   ondansetron (ZOFRAN) injection 4 mg (4 mg Intravenous $Given 8/5/24 1751)   iopamidol (ISOVUE-370) solution 104 mL (104 mLs Intravenous $Given 8/5/24 1738)   sodium chloride 0.9 % bag 500 mL for CT scan flush use (60 mLs Intravenous $Given 8/5/24 1738)   HYDROmorphone (PF) (DILAUDID) injection 0.3 mg (0.3 mg Intravenous $Given 8/5/24 1929)       Assessments & Plan (with Medical Decision Making)     I have reviewed the nursing notes.    74 year old female evaluated for left sided abdominal pain since colonoscopy 4 days ago. Left sided tenderness with guarding. Labs including UA and CT A/P unremarkable. General surgery consulted who feels this is postpolypectomy syndrome. Pain alleviated per above  treatments. Discharged home with below plan.    I have reviewed the findings, diagnosis, and plan with the patient.    Patient instructions:   Your labs cleaning urine study, and CT scan were all reassuring with no concerning findings.      Your case was discussed with our on-call general surgeon who states abdominal pain after colonoscopy can occur due to irritated intestine tissue polyps have been removed or biopsies of tissue been taken.    Set up appointment with your [facility] provider in the event your pain does not resolve.    For the next 3 days, take Tylenol 650 mg every 6 hours while awake.     A short course of percocet narcotic pain medication has been provided for severe break-through pain not controlled by tylenol. Use carefully as narcotic pain medication can be addictive and dangerous (including life-threatening) if not used as prescribed. Side effects can include sedation, nausea, or constipation. For constipation use stool softener. Don't mix with alcohol, sedating drugs, or drive while taking.      Discharge Medication List as of 8/5/2024  8:03 PM        START taking these medications    Details   oxyCODONE-acetaminophen (PERCOCET) 5-325 MG tablet Take 1 tablet by mouth every 6 hours as needed for severe pain, Disp-12 tablet, R-0, [facility]             Final diagnoses:   Postpolypectomy electrocoagulation syndrome   Left sided abdominal pain       8/5/2024   [facility] AND \[facility] [place] [facility]\""     Ray Ugalde MD  08/05/24 2020

## 2024-08-06 NOTE — DISCHARGE INSTRUCTIONS
Your labs cleaning urine study, and CT scan were all reassuring with no concerning findings.      Your case was discussed with our on-call general surgeon who states abdominal pain after colonoscopy can occur due to irritated intestine tissue polyps have been removed or biopsies of tissue been taken.    Set up appointment with your [facility] provider in the event your pain does not resolve.    For the next 3 days, take Tylenol 650 mg every 6 hours while awake.     A short course of percocet narcotic pain medication has been provided for severe break-through pain not controlled by tylenol. Use carefully as narcotic pain medication can be addictive and dangerous (including life-threatening) if not used as prescribed. Side effects can include sedation, nausea, or constipation. For constipation use stool softener. Don't mix with alcohol, sedating drugs, or drive while taking.

## 2025-06-09 ENCOUNTER — HOSPITAL ENCOUNTER (EMERGENCY)
Facility: HOSPITAL | Age: 75
Discharge: HOME | End: 2025-06-09
Payer: COMMERCIAL

## 2025-06-09 VITALS
DIASTOLIC BLOOD PRESSURE: 77 MMHG | HEART RATE: 72 BPM | OXYGEN SATURATION: 98 % | SYSTOLIC BLOOD PRESSURE: 144 MMHG | TEMPERATURE: 97.9 F | RESPIRATION RATE: 18 BRPM

## 2025-06-09 DIAGNOSIS — J01.90 ACUTE SINUSITIS WITH SYMPTOMS > 10 DAYS: ICD-10-CM

## 2025-06-09 PROCEDURE — 99213 OFFICE O/P EST LOW 20 MIN: CPT

## 2025-06-09 PROCEDURE — G0463 HOSPITAL OUTPT CLINIC VISIT: HCPCS

## 2025-06-09 RX ORDER — CEFDINIR 300 MG/1
300 CAPSULE ORAL 2 TIMES DAILY
Qty: 20 CAPSULE | Refills: 0 | Status: SHIPPED | OUTPATIENT
Start: 2025-06-09 | End: 2025-06-19

## 2025-06-09 ASSESSMENT — ENCOUNTER SYMPTOMS
CARDIOVASCULAR NEGATIVE: 1
SORE THROAT: 1
EYES NEGATIVE: 1
PSYCHIATRIC NEGATIVE: 1
APNEA: 0
ABDOMINAL DISTENTION: 0
SINUS PRESSURE: 1
MUSCULOSKELETAL NEGATIVE: 1
VOMITING: 1
ABDOMINAL PAIN: 1
FATIGUE: 1
COUGH: 1
DIARRHEA: 1
RHINORRHEA: 1
SINUS PAIN: 1
TROUBLE SWALLOWING: 1

## 2025-06-09 NOTE — ED PROVIDER NOTES
History     Chief Complaint   Patient presents with    Sinusitis     The history is provided by the patient.     Sammie Heaton is a 75 year old female who presents for sinus pressure, pharyngitis, HA and cough x 2 weeks. She also reports having norovirus a week ago, but those symptoms have resolved.     Allergies:  Allergies   Allergen Reactions    Atorvastatin Unknown    Codeine GI Disturbance     constipation    Sulfa Antibiotics Rash     Flush; sulfonamide antibiotics       Problem List:    Patient Active Problem List    Diagnosis Date Noted    Acute colitis 05/27/2024     Priority: Medium    Pancreatic calcification 05/27/2024     Priority: Medium    After-cataract with vision obscured 07/14/2020     Priority: Medium    Essential hypertension 06/11/2020     Priority: Medium    Open wound of lower leg 06/11/2020     Priority: Medium    Malignant neoplasm of female breast (H) 05/21/2019     Priority: Medium     Formatting of this note might be different from the original.  Added automatically from request for surgery [id number]      Abnormal mammogram 05/08/2019     Priority: Medium    Arthritis of hand 05/08/2019     Priority: Medium    Headache 05/08/2019     Priority: Medium    Positive test for genetic marker of susceptibility to malignant neoplasm of breast 05/06/2019     Priority: Medium    Peripheral venous insufficiency 04/26/2017     Priority: Medium    General medical exam 04/26/2017     Priority: Medium    Venous stasis ulcers of both lower extremities (H) 06/03/2016     Priority: Medium    ACP (advance care planning) 05/25/2016     Priority: Medium     Advance Care Planning 5/25/2016: ACP Review of Chart / Resources Provided:  Reviewed chart for advance care plan.  Sammie Heaton has no plan or code status on file however states presence of ACP document. Copy requested. Confirmed code status reflects current choices pending receipt of document/advance care plan review.  Confirmed/documented  legally designated decision makers.  Added by Cleo Yusuf      Advance Care Planning 6/9/2017: ACP Review of Chart / Resources Provided:  Reviewed chart for advance care plan.  Sammie Heaton has no plan or code status on file however states presence of ACP document. Copy requested.   Added by Luz Ford            Disorder of respiratory system exacerbated by aspirin 05/09/2016     Priority: Medium     Replacing diagnoses that were inactivated after the 10/1/2021 regulatory import.      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     Priority: Medium    Amphetamine user 05/17/2011     Priority: Medium    Idiopathic hypersomnia without long sleep time 05/17/2011     Priority: Medium    Osteopenia 04/18/2011     Priority: Medium    Basal cell papilloma 09/15/2009     Priority: Medium    Hyperlipidemia 09/15/2009     Priority: Medium        Past Medical History:    Past Medical History:   Diagnosis Date    Abdominal pain, unspecified site 09/19/2001    Cystitis, unspecified 05/12/2003    Dermatophytosis of the body 11/07/2006    Follow-up examination, following unspecified surgery 03/26/2003    Hemorrhage of rectum and anus 10/20/2005    Nonspecific abnormal findings on radiological and 06/27/2000    Osteoporosis, unspecified 07/26/2000    Other abnormal findings on radiological examinatio 03/18/2002    Other screening mammogram 09/11/2001    Routine general medical examination at a health care facility 06/14/2000    Sebaceous cyst 03/10/2003    Unspecified ulcer of lower limb 11/06/2003    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) 12/03/2003    Venous (peripheral) insufficiency, unspecified 05/27/2004       Past Surgical History:    Past Surgical History:   Procedure Laterality Date    BLADDER SURGERY  07/31/2017    bladder repair done at [place]    COLONOSCOPY  11-    ENT SURGERY      tonsillectomy    GYN SURGERY  1987    hysterectomy    GYN SURGERY  2014    right ovary removed at [place]     PHACOEMULSIFICATION WITH STANDARD INTRAOCULAR LENS IMPLANT Left 9/10/2015    Procedure: PHACOEMULSIFICATION WITH STANDARD INTRAOCULAR LENS IMPLANT;  Surgeon: Ray Lucero MD;  Location: [facility]    PHACOEMULSIFICATION WITH STANDARD INTRAOCULAR LENS IMPLANT Right 2015    Procedure: PHACOEMULSIFICATION WITH STANDARD INTRAOCULAR LENS IMPLANT;  Surgeon: Ray Lucero MD;  Location: [facility]       Family History:    Family History   Problem Relation Age of Onset    Heart Disease Mother     C.A.D. Mother     Heart Disease Father         MI    Blood Disease Father     Breast Cancer Sister        Social History:  Marital Status:   [2]  Social History     Tobacco Use    Smoking status: Former     Current packs/day: 0.00     Average packs/day: 2.0 packs/day for 17.4 years (34.9 ttl pk-yrs)     Types: Cigarettes     Start date: 1966     Quit date: 1983     Years since quittin.0    Smokeless tobacco: Never   Substance Use Topics    Alcohol use: No    Drug use: No        Medications:    amLODIPine (NORVASC) 10 MG tablet  Ascorbic Acid (VITAMIN C PO)  aspirin 81 MG tablet  betamethasone dipropionate (DIPROSONE) 0.05 % external cream  Biotin-Vitamin C (HAIR SKIN NAILS GUMMIES PO)  carboxymethylcellulose (CARBOXYMETHYLCELLULOSE SODIUM) 0.5 % SOLN ophthalmic solution  cefdinir (OMNICEF) 300 MG capsule  Collagen-Vitamin C (GNP COLLAGEN PLUS VITAMIN C PO)  Cranberry-Vitamin C-Probiotic (AZO CRANBERRY PO)  donepezil (ARICEPT) 10 MG tablet  fexofenadine (ALLEGRA) 180 MG tablet  fish oil-omega-3 fatty acids 1000 MG capsule  GARLIC PO  glucosamine 500 MG CAPS capsule  Lactobacillus (ACIDOPHILUS PROBIOTIC PO)  losartan (COZAAR) 100 MG tablet  Multiple Vitamins-Minerals (MULTIVITAMIN GUMMIES ADULT PO)  oxyCODONE-acetaminophen (PERCOCET) 5-325 MG tablet  psyllium (METAMUCIL/KONSYL) Packet  simvastatin (ZOCOR) 10 MG tablet  Turmeric Curcumin 500 MG CAPS  UNABLE TO FIND          Review of Systems    Constitutional:  Positive for fatigue.   HENT:  Positive for congestion, postnasal drip, rhinorrhea, sinus pressure, sinus pain, sore throat and trouble swallowing. Negative for ear discharge and ear pain.    Eyes: Negative.    Respiratory:  Positive for cough. Negative for apnea.    Cardiovascular: Negative.    Gastrointestinal:  Positive for abdominal pain, diarrhea and vomiting. Negative for abdominal distention.        Endorses LLQ abdominal discomfort which has been present since colitis surgery in December 2024 - has been worked up by her specialist in [place] and does not report worsening symptoms from her baseline   Genitourinary: Negative.    Musculoskeletal: Negative.    Psychiatric/Behavioral: Negative.     All other systems reviewed and are negative.      Physical Exam   BP: (!) 144/77  Pulse: 72  Temp: 97.9  F (36.6  C)  Resp: 18  SpO2: 98 %      Physical Exam  Vitals and nursing note reviewed.   Constitutional:       General: She is not in acute distress.     Appearance: She is not ill-appearing, toxic-appearing or diaphoretic.   HENT:      Head: Normocephalic and atraumatic.      Right Ear: Tympanic membrane and ear canal normal. There is no impacted cerumen.      Left Ear: Tympanic membrane and ear canal normal. There is no impacted cerumen.      Nose: Congestion and rhinorrhea present.      Mouth/Throat:      Mouth: Mucous membranes are moist.      Pharynx: Oropharynx is clear. Posterior oropharyngeal erythema present.      Comments: Mild posterior oropharynx erythema noted - no exudate  Cardiovascular:      Rate and Rhythm: Normal rate and regular rhythm.   Pulmonary:      Effort: Pulmonary effort is normal. No respiratory distress.      Breath sounds: Normal breath sounds. No stridor. No wheezing, rhonchi or rales.   Chest:      Chest wall: No tenderness.   Abdominal:      General: Bowel sounds are normal. There is no distension.      Palpations: Abdomen is soft. There is no mass.      Tenderness:  There is no abdominal tenderness. There is no guarding or rebound.      Hernia: No hernia is present.   Musculoskeletal:      Cervical back: Neck supple.   Lymphadenopathy:      Cervical: No cervical adenopathy.   Neurological:      General: No focal deficit present.      Mental Status: She is alert and oriented to person, place, and time. Mental status is at baseline.         ED Course       ED Course as of 06/09/25 1600   Mon Jun 09, 2025   1516 Sammie is a nontoxic appearing 75 year old female who presents to  for evaluation of sinus pressure, pharyngitis, headache cough for the past greater than 2 weeks.  She also reports that she had norovirus about a week ago, including diarrhea and vomiting for about a week, but the symptoms have subsided.  She has been following the brat diet and utilizing liquid IV every day for electrolyte support.   1517 She summers in [place], lives in [place]. Follows an allergist in [place], who prescribes levocertrizine daily. Does have notable history of colitis in December 2024 requiring 5 inches of her bowel to be removed and her gallbladder removed in March 2025.    1518 She reports her daughter got her amoxicillin prescription from [place], where she has been taking 500 mg TID x 7 days - today is day 8 of self treatment, which is reported ineffective as her symptoms are worsening.     She reports having multiple sinus imaging completed in [place].     Given only one failed antibiotic, I will order cefdinir for 10 days. If this treatment fails, we discussed returning to urgent care to discuss CT imaging at that time.    1520 MDM: Patient ambulatory at their baseline. Nontoxic in appearance with no distress noted.   Neuro: Awake, alert and oriented. Answers questions appropriately.   Resp: Lungs clear bilaterally to auscultation. SpO2 98% on room air. Respirations even and non-labored. No increased work of breathing.  Able to speak in full sentences without appearing short of breath.  No verbal statements of shortness of breath or difficulty breathing.   Card: Apical rate regular, no tachycardia noted. No abnormal heart tones.   ENT: Bilateral tympanic membranes translucent in color. No erythema, perforation, drainage, bulging or retraction of TM noted. No mastoid erythema, edema or tenderness noted. No nasal congestion or rhinorrhea. Posterior oropharynx clear. No erythema or exudate noted.  No trismus, increased swallowing, difficulty swallowing or drooling noted. No cervical lymphadenopathy noted.   GI: Abdominal palpation soft throughout. Bowel sounds audible. No nausea, vomiting or change in bowel habits reported.   : No changes in urinary pattern habits.   Mus: No decrease in range of motion  Derm: No rash, lesions, open areas, erythema or skin concerns.      1525 Given one failed antimicrobial treatment, per UpToDate guidelines, a different antibiotic class ordered prior to ordering imaging. Discussed when to return to ED for re-evaluation, as she has no PCP in [place] to follow    1526 Discharge instructions and education completed with Sammie Heaton. Sammie Heaton verbalized understanding of eduction and discharge instructions provided, which includes return to ED for re-evaluation criteria. They are in agreement with plan of care.             No results found for this or any previous visit (from the past 24 hours).    Medications - No data to display    Assessments & Plan (with Medical Decision Making)     I have reviewed the nursing notes.    I have reviewed the findings, diagnosis, plan and need for follow up with the patient.    (J01.90) Acute sinusitis with symptoms > 10 days  Comment: hard script sent per request  Plan: cefdinir (OMNICEF) 300 MG capsule          Discharge instructions and education completed with Sammie Heaton. Sammie Heaton verbalized understanding of eduction and discharge instructions provided, which includes return to ED for re-evaluation criteria. They  are in agreement with plan of care.       Discharge Medication List as of 6/9/2025  3:26 PM        START taking these medications    Details   cefdinir (OMNICEF) 300 MG capsule Take 1 capsule (300 mg) by mouth 2 times daily for 10 days., Disp-20 capsule, R-0, Local Print             Final diagnoses:   Acute sinusitis with symptoms > 10 days       6/9/2025   [facility]       Debra Ha APRN CNP  06/09/25 1600

## 2025-06-09 NOTE — DISCHARGE INSTRUCTIONS
- Prescription for cefdinir printed per request  - If sx do not improve, return for imaging of sinus   - Monitor for signs of secondary bacterial infection such as new onset of fever, chills, rigors, shortness of breath, fast heart rate, or increased work of breathing. These symptoms may indicate post-viral bacterial infections including (but not limited to) ear infections and pneumonia. This would require re-evaluation for treatment.   - Insure you are staying hydrated by drinking plenty of fluids or eating foods such as popsicles, jello or pudding.  - Get plenty of rest  - Warm salt water gurgles can help soothe sore throat  - Saline nasal spray and frequent suctioning/nose blowing can help with congestion.  - Monitor for fevers at home. If you have been approved by your primary care provider/specialists, treat with OTC Tylenol or Ibuprofen per package instruction. Make sure you eat when you take ibuprofen to avoid stomach upset. Ensure at least four hours in-between tylenol-tylenol and ibuprofen-ibuprofen doses. Can alternate both medications. For example, Tylenol at 11 AM, ibuprofen at 1 PM, tylenol at 3 PM, ibuprofen at 5 PM and so on and so forth.   - Humidifier can help with congestion and help keep mucus membranes such as throat and nose from drying out (add bacteriostatic solution to humidifier - can be found at [employer])  - Honey can be soothing for a sore throat (this cannot be administered if patient is under 1 year of age)  - Sleeping slightly propped up can help with congestion and postnasal drainage that can worsen cough at bedtime  - OTC cough medications per package instructions to help with cough, check to see if the cough.cold medication already has acetaminophen (Tylenol) in it. If it does, avoid taking extra Tylenol doses.   - OTC antihistamines such as Allegra, Zyrtec, Claritin (generic is okay) can help with nasal/sinus congestion.   - OTC nasal steroid such as Flonase can help decrease sinus  inflammation associated with congestion

## (undated) RX ORDER — HYDROMORPHONE HYDROCHLORIDE 1 MG/ML
INJECTION, SOLUTION INTRAMUSCULAR; INTRAVENOUS; SUBCUTANEOUS
Status: DISPENSED
Start: 2024-08-05

## (undated) RX ORDER — HYDROMORPHONE HCL IN WATER/PF 6 MG/30 ML
PATIENT CONTROLLED ANALGESIA SYRINGE INTRAVENOUS
Status: DISPENSED
Start: 2024-05-27

## (undated) RX ORDER — ONDANSETRON 2 MG/ML
INJECTION INTRAMUSCULAR; INTRAVENOUS
Status: DISPENSED
Start: 2024-08-05

## (undated) RX ORDER — PIPERACILLIN SODIUM, TAZOBACTAM SODIUM 4; .5 G/20ML; G/20ML
INJECTION, POWDER, LYOPHILIZED, FOR SOLUTION INTRAVENOUS
Status: DISPENSED
Start: 2024-05-27

## (undated) RX ORDER — ONDANSETRON 2 MG/ML
INJECTION INTRAMUSCULAR; INTRAVENOUS
Status: DISPENSED
Start: 2024-05-27